# Patient Record
Sex: FEMALE | Race: WHITE | NOT HISPANIC OR LATINO | Employment: STUDENT | URBAN - METROPOLITAN AREA
[De-identification: names, ages, dates, MRNs, and addresses within clinical notes are randomized per-mention and may not be internally consistent; named-entity substitution may affect disease eponyms.]

---

## 2017-05-05 ENCOUNTER — HISTORICAL (OUTPATIENT)
Dept: LAB | Facility: HOSPITAL | Age: 22
End: 2017-05-05

## 2017-05-05 LAB
ABS NEUT (OLG): 4.9 X10(3)/MCL
BASOPHILS # BLD AUTO: 0 X10(3)/MCL
BASOPHILS NFR BLD AUTO: 0.1 % (ref 0–1)
DEPRECATED CALCIDIOL+CALCIFEROL SERPL-MC: 38.7 NG/ML (ref 30–80)
EOSINOPHIL # BLD AUTO: 0 X10(3)/MCL
EOSINOPHIL NFR BLD AUTO: 0.7 % (ref 0–6.4)
ERYTHROCYTE [DISTWIDTH] IN BLOOD BY AUTOMATED COUNT: 13.5 % (ref 11.5–14.8)
HCT VFR BLD AUTO: 47.5 % (ref 35.5–44.6)
HGB BLD-MCNC: 16.3 GM/DL (ref 12.1–15.4)
LYMPHOCYTES # BLD AUTO: 2.1 X10(3)/MCL
LYMPHOCYTES NFR BLD AUTO: 27.7 % (ref 16–44)
MCH RBC QN AUTO: 29.7 PG (ref 28.5–33.8)
MCHC RBC AUTO-ENTMCNC: 34.3 % (ref 33–37)
MCV RBC AUTO: 86.5 FL (ref 82–99)
MONOCYTES # BLD AUTO: 0.5 X10(3)/MCL
MONOCYTES NFR BLD AUTO: 6.6 % (ref 4–12.1)
NEUTROPHILS # BLD AUTO: 4.9 X10(3)/MCL
NEUTROPHILS NFR BLD AUTO: 64.5 % (ref 43–73)
PLATELET # BLD AUTO: 325 X10(3)/MCL (ref 136–369)
PMV BLD AUTO: 9.8 FL (ref 7.4–10.4)
RBC # BLD AUTO: 5.49 X10(6)/MCL (ref 4–5.5)
WBC # SPEC AUTO: 7.6 X10(3)/MCL (ref 4–10)

## 2017-05-15 ENCOUNTER — TELEPHONE (OUTPATIENT)
Dept: BARIATRICS | Facility: CLINIC | Age: 22
End: 2017-05-15

## 2017-05-15 NOTE — TELEPHONE ENCOUNTER
Returned patient's call.  She has regained weight and was calling to see if surgery is an option.  i told her we don't do sx on medicaid pts.  [place]  i told her first to come in and try diet and medications firt.  appts scheduled and sent to her in mail.

## 2017-05-15 NOTE — TELEPHONE ENCOUNTER
----- Message from Sheree Ho sent at 5/15/2017  9:35 AM CDT -----  Contact: self  Pt states she would like to speak with PA in ref to other options regarding her weight loss journey.Please call Ivana swain @ [phone].Thank you.

## 2017-06-13 ENCOUNTER — LAB VISIT (OUTPATIENT)
Dept: LAB | Facility: HOSPITAL | Age: 22
End: 2017-06-13
Attending: SURGERY
Payer: MEDICAID

## 2017-06-13 ENCOUNTER — OFFICE VISIT (OUTPATIENT)
Dept: BARIATRICS | Facility: CLINIC | Age: 22
End: 2017-06-13
Payer: MEDICAID

## 2017-06-13 ENCOUNTER — PATIENT MESSAGE (OUTPATIENT)
Dept: BARIATRICS | Facility: CLINIC | Age: 22
End: 2017-06-13

## 2017-06-13 ENCOUNTER — TELEPHONE (OUTPATIENT)
Dept: BARIATRICS | Facility: CLINIC | Age: 22
End: 2017-06-13

## 2017-06-13 VITALS
SYSTOLIC BLOOD PRESSURE: 90 MMHG | WEIGHT: 211 LBS | BODY MASS INDEX: 38.83 KG/M2 | HEART RATE: 89 BPM | HEIGHT: 62 IN | DIASTOLIC BLOOD PRESSURE: 60 MMHG

## 2017-06-13 DIAGNOSIS — G47.33 OSA (OBSTRUCTIVE SLEEP APNEA): ICD-10-CM

## 2017-06-13 DIAGNOSIS — Z98.84 S/P LAPAROSCOPIC SLEEVE GASTRECTOMY: ICD-10-CM

## 2017-06-13 DIAGNOSIS — E66.9 OBESITY, UNSPECIFIED OBESITY SEVERITY, UNSPECIFIED OBESITY TYPE: ICD-10-CM

## 2017-06-13 DIAGNOSIS — E78.5 HYPERLIPIDEMIA, UNSPECIFIED HYPERLIPIDEMIA TYPE: ICD-10-CM

## 2017-06-13 DIAGNOSIS — Z90.3 HISTORY OF SLEEVE GASTRECTOMY: ICD-10-CM

## 2017-06-13 DIAGNOSIS — E55.9 VITAMIN D DEFICIENCY: ICD-10-CM

## 2017-06-13 DIAGNOSIS — K75.81 NASH (NONALCOHOLIC STEATOHEPATITIS): ICD-10-CM

## 2017-06-13 LAB
25(OH)D3+25(OH)D2 SERPL-MCNC: 29 NG/ML
ALBUMIN SERPL BCP-MCNC: 3.9 G/DL
ALP SERPL-CCNC: 109 U/L
ALT SERPL W/O P-5'-P-CCNC: 13 U/L
ANION GAP SERPL CALC-SCNC: 10 MMOL/L
AST SERPL-CCNC: 15 U/L
BASOPHILS # BLD AUTO: 0.01 K/UL
BASOPHILS NFR BLD: 0.1 %
BILIRUB SERPL-MCNC: 0.4 MG/DL
BUN SERPL-MCNC: 10 MG/DL
CALCIUM SERPL-MCNC: 9.4 MG/DL
CHLORIDE SERPL-SCNC: 107 MMOL/L
CHOLEST/HDLC SERPL: 3.1 {RATIO}
CO2 SERPL-SCNC: 23 MMOL/L
CREAT SERPL-MCNC: 0.7 MG/DL
DIFFERENTIAL METHOD: ABNORMAL
EOSINOPHIL # BLD AUTO: 0 K/UL
EOSINOPHIL NFR BLD: 0.2 %
ERYTHROCYTE [DISTWIDTH] IN BLOOD BY AUTOMATED COUNT: 13.3 %
EST. GFR  (AFRICAN AMERICAN): >60 ML/MIN/1.73 M^2
EST. GFR  (NON AFRICAN AMERICAN): >60 ML/MIN/1.73 M^2
GLUCOSE SERPL-MCNC: 96 MG/DL
HCT VFR BLD AUTO: 45.3 %
HDL/CHOLESTEROL RATIO: 32.7 %
HDLC SERPL-MCNC: 147 MG/DL
HDLC SERPL-MCNC: 48 MG/DL
HGB BLD-MCNC: 15.1 G/DL
IRON SERPL-MCNC: 45 UG/DL
LDLC SERPL CALC-MCNC: 86 MG/DL
LYMPHOCYTES # BLD AUTO: 2.6 K/UL
LYMPHOCYTES NFR BLD: 22.3 %
MCH RBC QN AUTO: 29.4 PG
MCHC RBC AUTO-ENTMCNC: 33.3 %
MCV RBC AUTO: 88 FL
MONOCYTES # BLD AUTO: 0.7 K/UL
MONOCYTES NFR BLD: 6 %
NEUTROPHILS # BLD AUTO: 8.2 K/UL
NEUTROPHILS NFR BLD: 71 %
NONHDLC SERPL-MCNC: 99 MG/DL
PLATELET # BLD AUTO: 352 K/UL
PMV BLD AUTO: 9.5 FL
POTASSIUM SERPL-SCNC: 3.7 MMOL/L
PROT SERPL-MCNC: 7.5 G/DL
RBC # BLD AUTO: 5.14 M/UL
SATURATED IRON: 10 %
SODIUM SERPL-SCNC: 140 MMOL/L
TOTAL IRON BINDING CAPACITY: 453 UG/DL
TRANSFERRIN SERPL-MCNC: 306 MG/DL
TRIGL SERPL-MCNC: 65 MG/DL
VIT B12 SERPL-MCNC: 415 PG/ML
WBC # BLD AUTO: 11.58 K/UL

## 2017-06-13 PROCEDURE — 99214 OFFICE O/P EST MOD 30 MIN: CPT | Mod: S$PBB,,, | Performed by: PHYSICIAN ASSISTANT

## 2017-06-13 PROCEDURE — 85025 COMPLETE CBC W/AUTO DIFF WBC: CPT

## 2017-06-13 PROCEDURE — 99999 PR PBB SHADOW E&M-EST. PATIENT-LVL IV: CPT | Mod: PBBFAC,,, | Performed by: PHYSICIAN ASSISTANT

## 2017-06-13 PROCEDURE — 84425 ASSAY OF VITAMIN B-1: CPT

## 2017-06-13 PROCEDURE — 82306 VITAMIN D 25 HYDROXY: CPT

## 2017-06-13 PROCEDURE — 80061 LIPID PANEL: CPT

## 2017-06-13 PROCEDURE — 82607 VITAMIN B-12: CPT

## 2017-06-13 PROCEDURE — 80053 COMPREHEN METABOLIC PANEL: CPT

## 2017-06-13 PROCEDURE — 83540 ASSAY OF IRON: CPT

## 2017-06-13 RX ORDER — ATORVASTATIN CALCIUM 20 MG/1
1 TABLET, FILM COATED ORAL DAILY
COMMUNITY
Start: 2017-05-23

## 2017-06-13 RX ORDER — PREDNISONE 20 MG/1
1 TABLET ORAL DAILY
COMMUNITY
Start: 2017-06-12

## 2017-06-13 RX ORDER — BENZONATATE 200 MG/1
1 CAPSULE ORAL 3 TIMES DAILY PRN
COMMUNITY
Start: 2017-06-12

## 2017-06-13 RX ORDER — LORATADINE 10 MG/1
1 TABLET ORAL DAILY
COMMUNITY
Start: 2017-05-23

## 2017-06-13 RX ORDER — ALBUTEROL SULFATE 0.83 MG/ML
2.5 SOLUTION RESPIRATORY (INHALATION) DAILY PRN
Refills: 3 | COMMUNITY
Start: 2017-04-19 | End: 2017-06-13 | Stop reason: CLARIF

## 2017-06-13 RX ORDER — QUETIAPINE FUMARATE 50 MG/1
2 TABLET, FILM COATED ORAL NIGHTLY
COMMUNITY
Start: 2017-06-08

## 2017-06-13 RX ORDER — ALBUTEROL SULFATE 90 UG/1
1 AEROSOL, METERED RESPIRATORY (INHALATION) DAILY PRN
Refills: 3 | COMMUNITY
Start: 2017-04-19

## 2017-06-13 RX ORDER — FLUTICASONE PROPIONATE 50 MCG
1 SPRAY, SUSPENSION (ML) NASAL DAILY
Refills: 3 | COMMUNITY
Start: 2017-04-19

## 2017-06-13 RX ORDER — MONTELUKAST SODIUM 10 MG/1
1 TABLET ORAL DAILY
COMMUNITY
Start: 2017-05-23

## 2017-06-13 RX ORDER — FLUTICASONE PROPIONATE 220 UG/1
2 AEROSOL, METERED RESPIRATORY (INHALATION) 2 TIMES DAILY
Refills: 3 | COMMUNITY
Start: 2017-04-19

## 2017-06-13 RX ORDER — ERGOCALCIFEROL 1.25 MG/1
1 CAPSULE ORAL WEEKLY
COMMUNITY
Start: 2017-06-09

## 2017-06-13 RX ORDER — LORAZEPAM 0.5 MG/1
1 TABLET ORAL 2 TIMES DAILY
COMMUNITY
Start: 2017-06-08

## 2017-06-13 NOTE — TELEPHONE ENCOUNTER
----- Message from Rohith Glez sent at 6/13/2017  2:15 PM CDT -----  Pt would like for Justine to give her a call when she get the results from lab work. Please call pt at [phone]

## 2017-06-13 NOTE — PATIENT INSTRUCTIONS
- Stop all junk food and sodas  - Take a Cool Brew and add to a Body Fortress.  - Get in more protein      Breakfast options:     - Egg white omelet: 2 egg whites or ½ cup Egg Beaters. (Optional proteins: cheese, shrimp, black beans, chicken, sliced turkey) (Optional veggies: tomatoes, salsa, spinach, mushrooms, onions, green peppers, or small slice avocado)     - Egg and sausage: 1 egg or ¼ cup Egg Beaters (any variety), with 1 chacho or 2 links of Turkey sausage or Veggie breakfast sausage (Openbay or @Pay)    - Crust-less breakfast quiche: To make a glass pie dish, mix 4oz part skim Ricotta, 1 cup skim milk, and 2 eggs as your base. Add protein: shredded cheese, sliced lean ham or turkey, turkey anderson/sausage. Add veggies: tomato, onion, green onion, mushroom, green pepper, spinach, etc.    - Yogurt parfait: Mix 1 - 6oz container Dannon Light N Fit vanilla yogurt, with ¼ cup crushed unsalted nuts    - Cottage cheese and fruit: ½ cup part-skim cottage cheese or ricotta cheese topped with fresh fruit or sugar free preserves     - Shawna Stephenson's Vanilla Egg custard* (add 2 Tbsp instant coffee granules to make Cappuccino Custard*)    - Hi-Protein café latte (skim milk, decaf coffee, 1 scoop protein powder). Optional to add Sugar free syrup or extract flavoring.    - Breakfast Lox: spread fat free cream cheese on slices of smoked salmon. Serve over scrambled or egg over easy (sauteed with nonstick cookspray) OR on a cucumber slice    - Eggwhich: Scramble or cook 1 large egg over easy using nonstick cookspray. Place between 2 slices of Fijian anderson and low fat cheese.     Lunch: (20-30g protein)    - ½ cup Black bean soup (Homemade or Progresso), with ¼ cup shredded low-fat cheese. Top with chopped tomato or fresh salsa.     - Lean deli turkey breast and low-fat sliced cheese, mustard or light tavera to moisten, rolled up together, or wrapped in a Angel lettuce leaf    - Chicken salad made from dinner  leftovers, moisten with low-fat salad dressing or light tavera. Also try leftover salmon, shrimp, tuna or boiled eggs. Serve ½ cup over dark green salad    - Fat-free canned refried beans, topped with ¼ cup shredded low-fat cheese. Top with chopped tomato or fresh salsa.     - Greek salad: Top mixed greens with 1-2oz grilled chicken, tomatoes, red onions, 2-3 kalamata olives, and sprinkle lightly with feta cheese. Spritz with Balsamic vinegar to taste.     - Crust-less lunch quiche: To make a glass pie dish, mix 4oz part skim Ricotta, 1 cup skim milk, and 2 eggs as your base. Add protein: shredded cheese, sliced lean ham or turkey, shrimp, chicken. Add veggies: tomato, onion, green onion, mushroom, green pepper, spinach, artichoke, broccoli, etc.    - Pizza bake: spread a irineo jaxon mushroom with tomato sauce, low-fat shredded mozzarella and turkey pepperoni or [place] anderson. Add any veggies. Roast for 10-15 minutes, until cheese melted.     - Cucumber crab bites: Spread ¼ cup crab dip (lump crabmeat + light cream cheese and green onions) over sliced cucumber.     - Chicken with light spinach and artichoke dip*: Puree in : 6oz cooked and drained spinach, 2 cloves garlic, 1 can cannelloni beans, ½ cup chopped green onions, 1 can drained artichoke hearts (not marinated in oil), lemon juice and basil. Mix in 2oz chopped up chicken.    Supper: (20-30g protein)    - Serve grilled fish over dark green salad tossed with low-fat dressing, served with grilled asparagus barton     - Rotisserie chicken salad: served with sliced strawberries, walnuts, fat-free feta cheese crumbles and 1 tbsp Duggans Own Light Raspberry [place] Vinaigrette    - Shrimp cocktail: Dip cold boiled shrimp in homemade low-sugar cocktail sauce (1/2 cup Tequila One Carb ketchup, 2 tbsp horseradish, 1/4 tsp hot sauce, 1 tsp Worcestershire sauce, 1 tbsp freshly-squeezed lemon juice). Serve with dark green salad, walnuts, and crumbled blue  cheese drizzled with olive oil and Balsamic vinegar    - Tuna Melt: Spread tuna salad onto 2 thick slices of tomato. Top with low-fat cheese and broil until cheese is melted. May also be made with chicken salad of shrimp salad. Dolliver with different types of cheeses.    - Chicken or beef fajitas (no tortilla, rice, beans, chips). Top meat and veggies w/ fresh salsa, fat free sour cream.     - Homemade low-fat Chili using extra lean ground beef or ground turkey. Top with shredded cheese and salsa as desired. May add dollop fat-free sour cream if desired    - Chicken parmesan: Top chicken breast w/ low sugar marinara sauce, mozzarella cheese and bake until chicken reaches 165*. Serve w/ spaghetti SQUASH or Icelandic cut green beans    - Dinner Omelet with shrimp or chicken and onion, green peppers and chives.    - No noodle lasagna: Use sliced zucchini or eggplant in place of noodles. Layer with part skim ricotta cheese and low sugar meat sauce (use very lean ground beef or ground turkey).    - Mexican chicken bake: Bake chunks of chicken breast or thigh with taco seasoning, Pace brand enchilada sauce, green onions and low-fat cheese. Serve with ¼ cup black beans or fat free refried beans topped with chopped tomatoes or salsa.    - Lisa frozen meatballs, simmered in Classico Marinara sauce. Different flavors of salsa or spaghetti sauce create different dishes! Sprinkle with parmesan cheese. Serve with grilled or steamed veggies, or a dark green salad.    - Simmer boneless skinless chicken thigh chunks in Classico Marinara sauce or roasted salsa until tender with chopped onion, bell pepper, garlic, mushrooms, spinach, etc.     - Hamburger or veggie burger, without the bun, dressed the way you like. Served with grilled or steamed veggies.    - Eggplant parmesan: Bake slices of eggplant at 350 degrees for 15 minutes. Layer tomato sauce, sliced eggplant and low-fat mozzarella cheese in a baking dish and cover with  foil. Bake 30-40 more minutes or until bubbly. Uncover and bake at 400 degrees for about 15 more minutes, or until top is slightly crisp.    - Fish tacos: grilled/baked white fish, wrapped in Angel lettuce leaf, topped with salsa, shredded low-fat cheese, and light coleslaw.    Snacks: (100-200 calories; >5g protein)    - 1 low-fat cheese stick with 8 cherry tomatoes or 1 serving fresh fruit  - 4 thin slices fat-free turkey breast and 1 slice low-fat cheese  - 4 thin slices fat-free honey ham with wedge of melon  - 6-8 edamame pods (equivalent to about 1/4 cup edamame without pods).   - 1/4 cup unsalted nuts with ½ cup fruit  - 6-oz container Dannon Light n Fit vanilla yogurt, topped with 1oz unsalted nuts   - apple, celery or baby carrots spread with 2 Tbsp PB2  - apple slices with 1 oz slice low-fat cheese  - Apple slices dipped in 2 Tbsp of PB2  - celery, cucumber, bell pepper or baby carrots dipped in ¼ cup hummus bean spread or light spinach and artichoke dip (*recipe in lunch section)  - celery, cucumber, baby carrots dipped in high protein greek yogurt (Mix 16 oz plain greek yogurt + 1 packet of hidden valley ranch dip mix)  - Bandar Links Beef Steak - 14g protein! (similar to beef jerky)  - 2 wedges Laughing Cow - Light Herb & Garlic Cheese with sliced cucumber or green bell pepper  - 1/2 cup low-fat cottage cheese with ¼ cup fruit or ¼ cup salsa  - RTD Protein drinks: Atkins, Low Carb Slim Fast, EAS light, Muscle Milk Light, etc.  - Homemade Protein drinks: GNC Soy95, Isopure, Nectar, UNJURY, Whey Gourmet, etc. Mix 1 scoop powder with 8oz skim/1% milk or light soymilk.  - Protein bars: Atkins, EAS, Pure Protein, Think Thin, Detour, etc. Must have 0-4 grams sugar - Read the label.    Takeout Options: No more than twice/week  Deli - Salads (no pasta or rice), meats, cheeses. Roasted chicken. Lox (salmon)    Mexican - Platters which don't include tortillas, chips, or rice. Go easy on the beans. Example:  Fajitas without the tortillas. Ask the  not to bring chips to the table if they are too tempting.    Greek - Meat or fish and vegetable, but no bread or rice. Including hummus, baba ganoush, etc, is OK. Most sit-down Greek restaurants can provide you with cucumber slices for dipping instead of wale bread.    Fast Food (Avoid as much as possible) - Salads (no croutons and limit salad dressing to 2 tbsp), grilled chicken sandwich without the bun and ask for no tavera. Agustinas low fat chili or Taco Bell pintos and cheese.    BBQ - The meats are fine if you ask for sauces on the side, but most of the traditional side dishes are loaded with carbs. Kvng slaw, baked beans and BBQ sauce are typically made with sugar.    Chinese - Nothing deep-fried, no rice or noodles. Many Chinese sauces have starch and sugar in them, so you'll have to use your judgement. If you find that these sauces trigger cravings, or cause Dumping, you can ask for the sauce to be made without sugar or just use soy sauce.

## 2017-06-13 NOTE — TELEPHONE ENCOUNTER
Sent patient MY justinstaniya message that when Maria Guadalupe reviews labs she'll be getting back with her.

## 2017-06-13 NOTE — PROGRESS NOTES
BARIATRIC FOLLOW UP:    Chief Complaint   Patient presents with    Follow-up     sleeve       HISTORY OF PRESENT ILLNESS: Ivana Ibarra is a 21 y.o. female with a Body mass index is 38.59 kg/m². who presents for a follow up s/p 12/8/2014 with Dr. Fernandez on 12/8/2014.  She has not been in for follow up for over a year.  She got pregnant not long after her surgery and was never able to lose more weight.  She is having a lot of health issues, most recent with asthma and pneuomonia.  She is smoking.  She wanted some medications to help with her weight loss, but I do not think this is appropriate at this point.  She needs to do a lot of work on her diet before I will send her to Dr. Myles.  She has lost 78 lbs, approximately 50% of their excess weight. She has no other complaints.    Denies: nausea, vomiting, abdominal pain, changes in bowel movement pattern, fever, chills, dysphagia, chest pain, and shortness of breath.    Review of Systems   Constitutional: Positive for chills and fever. Negative for malaise/fatigue.   HENT: Positive for congestion.    Eyes: Negative for blurred vision and double vision.   Respiratory: Positive for cough. Negative for hemoptysis and shortness of breath.    Cardiovascular: Negative for chest pain, palpitations and leg swelling.   Gastrointestinal: Negative for abdominal pain, blood in stool, constipation, diarrhea, heartburn, melena, nausea and vomiting.   Genitourinary: Negative for dysuria and hematuria.   Musculoskeletal: Negative for back pain, falls, joint pain, myalgias and neck pain.   Skin: Negative for rash.   Neurological: Negative for dizziness, tingling, weakness and headaches.   Endo/Heme/Allergies: Negative for environmental allergies. Does not bruise/bleed easily.   Psychiatric/Behavioral: Negative.        EXERCISE & VITAMINS:  See Bariatric Assessment    MEDICATIONS/ALLERGIES:  Have been reviewed.    DIET:  Off track    Vitals:    06/13/17 1121   BP:  90/60   Pulse: 89       Physical Exam   Constitutional: She is oriented to person, place, and time. She appears well-developed and well-nourished.   afebrile   HENT:   Head: Normocephalic and atraumatic.   Cardiovascular: Normal rate and regular rhythm.    Pulmonary/Chest: Effort normal and breath sounds normal.   Abdominal: Soft. Bowel sounds are normal. She exhibits no distension and no mass. There is no tenderness. There is no rebound and no guarding.   WHSS   Musculoskeletal: She exhibits no edema.   Neurological: She is alert and oriented to person, place, and time.   Skin: Skin is warm and dry. No rash noted. No erythema. No pallor.   Psychiatric: She has a normal mood and affect. Her behavior is normal.   Nursing note and vitals reviewed.      ASSESSMENT:  - Obesity, Body mass index is 38.59 kg/m².,  s/p sleeve gastrectomy on 12/8/2014.  - Estimated goal weight, 210 lbs, which is 50% EWL  - Co-morbidities: TOPHER (stable), BPAD (stable), HLD (stable), WILLIAM (stable)  - Good Weight loss, 78 lbs, 50% EWL  - No Exercise regimen  - No Vitamin Regimen  - Poor Diet  - Not at risk for fall or abuse    PLAN:  - Emphasized the importance of regular exercise and adherence to bariatric diet to achieve maximum weight loss.  - Encouraged patient to restart regular exercise.  - Follow-up with dietician to reinforce diet.  - Continue daily vitamins and medications.  - Anti-Acid medication, Omeprazole daily.  - Miralax daily for constipation, no fiber.  - RTC in 1 month or sooner if needed.  - Call the office for any issues.  - Check labs today.    25 minute visit, over 50% of time spent counseling patient face to face on diet, exercise, and weight loss.

## 2017-06-16 LAB — VIT B1 SERPL-MCNC: 46 UG/L (ref 38–122)

## 2017-06-20 ENCOUNTER — HISTORICAL (OUTPATIENT)
Dept: LAB | Facility: HOSPITAL | Age: 22
End: 2017-06-20

## 2017-07-11 ENCOUNTER — HISTORICAL (OUTPATIENT)
Dept: RADIOLOGY | Facility: HOSPITAL | Age: 22
End: 2017-07-11

## 2017-07-26 ENCOUNTER — HISTORICAL (OUTPATIENT)
Dept: ADMINISTRATIVE | Facility: HOSPITAL | Age: 22
End: 2017-07-26

## 2017-09-20 ENCOUNTER — HISTORICAL (OUTPATIENT)
Dept: RADIOLOGY | Facility: HOSPITAL | Age: 22
End: 2017-09-20

## 2017-10-18 ENCOUNTER — HOSPITAL ENCOUNTER (OUTPATIENT)
Dept: ONCOLOGY | Facility: HOSPITAL | Age: 22
End: 2017-10-18
Attending: INTERNAL MEDICINE | Admitting: INTERNAL MEDICINE

## 2017-10-18 LAB
ABS NEUT (OLG): 10.1 X10(3)/MCL (ref 2.1–9.2)
ALBUMIN SERPL-MCNC: 3.6 GM/DL (ref 3.4–5)
ALBUMIN/GLOB SERPL: 1.1 {RATIO}
ALP SERPL-CCNC: 98 UNIT/L (ref 38–126)
ALT SERPL-CCNC: 17 UNIT/L (ref 12–78)
APPEARANCE, UA: CLEAR
APTT PPP: 29.5 SECOND(S) (ref 24.8–36.9)
AST SERPL-CCNC: 11 UNIT/L (ref 15–37)
BACTERIA SPEC CULT: ABNORMAL /HPF
BASOPHILS # BLD AUTO: 0 X10(3)/MCL (ref 0–0.2)
BASOPHILS NFR BLD AUTO: 0 %
BILIRUB SERPL-MCNC: 0.2 MG/DL (ref 0.2–1)
BILIRUB UR QL STRIP: NEGATIVE
BILIRUBIN DIRECT+TOT PNL SERPL-MCNC: 0.1 MG/DL (ref 0–0.2)
BILIRUBIN DIRECT+TOT PNL SERPL-MCNC: 0.1 MG/DL (ref 0–0.8)
BUN SERPL-MCNC: 16 MG/DL (ref 7–18)
CALCIUM SERPL-MCNC: 8.5 MG/DL (ref 8.5–10.1)
CHLORIDE SERPL-SCNC: 110 MMOL/L (ref 98–107)
CO2 SERPL-SCNC: 23 MMOL/L (ref 21–32)
COLOR UR: YELLOW
CREAT SERPL-MCNC: 0.63 MG/DL (ref 0.55–1.02)
EOSINOPHIL # BLD AUTO: 0.1 X10(3)/MCL (ref 0–0.9)
EOSINOPHIL NFR BLD AUTO: 0 %
ERYTHROCYTE [DISTWIDTH] IN BLOOD BY AUTOMATED COUNT: 12.7 % (ref 11.5–17)
GLOBULIN SER-MCNC: 3.3 GM/DL (ref 2.4–3.5)
GLUCOSE (UA): NEGATIVE
GLUCOSE SERPL-MCNC: 113 MG/DL (ref 74–106)
GROUP & RH: NORMAL
HCT VFR BLD AUTO: 46 % (ref 37–47)
HGB BLD-MCNC: 15.6 GM/DL (ref 12–16)
HGB UR QL STRIP: NEGATIVE
INR PPP: 1 (ref 0–1.27)
KETONES UR QL STRIP: NEGATIVE
LEUKOCYTE ESTERASE UR QL STRIP: NEGATIVE
LIPASE SERPL-CCNC: 124 UNIT/L (ref 73–393)
LYMPHOCYTES # BLD AUTO: 2.5 X10(3)/MCL (ref 0.6–4.6)
LYMPHOCYTES NFR BLD AUTO: 19 %
MCH RBC QN AUTO: 29.8 PG (ref 27–31)
MCHC RBC AUTO-ENTMCNC: 33.9 GM/DL (ref 33–36)
MCV RBC AUTO: 87.8 FL (ref 80–94)
MONOCYTES # BLD AUTO: 0.7 X10(3)/MCL (ref 0.1–1.3)
MONOCYTES NFR BLD AUTO: 5 %
NEUTROPHILS # BLD AUTO: 10.1 X10(3)/MCL (ref 1.4–7.9)
NEUTROPHILS NFR BLD AUTO: 75 %
NITRITE UR QL STRIP: NEGATIVE
PH UR STRIP: 6.5 [PH] (ref 5–9)
PLATELET # BLD AUTO: 283 X10(3)/MCL (ref 130–400)
PMV BLD AUTO: 9.2 FL (ref 9.4–12.4)
POTASSIUM SERPL-SCNC: 3.5 MMOL/L (ref 3.5–5.1)
PROT SERPL-MCNC: 6.9 GM/DL (ref 6.4–8.2)
PROT UR QL STRIP: NEGATIVE
PROTHROMBIN TIME: 13 SECOND(S) (ref 12.2–14.7)
RBC # BLD AUTO: 5.24 X10(6)/MCL (ref 4.2–5.4)
RBC #/AREA URNS HPF: 6 /HPF (ref 0–2)
SODIUM SERPL-SCNC: 141 MMOL/L (ref 136–145)
SP GR UR STRIP: 1.02 (ref 1–1.03)
SQUAMOUS EPITHELIAL, UA: ABNORMAL
UROBILINOGEN UR STRIP-ACNC: 1
WBC # SPEC AUTO: 13.5 X10(3)/MCL (ref 4.5–11.5)
WBC #/AREA URNS HPF: ABNORMAL /[HPF]

## 2017-11-15 ENCOUNTER — HISTORICAL (OUTPATIENT)
Dept: INTERNAL MEDICINE | Facility: CLINIC | Age: 22
End: 2017-11-15

## 2018-01-18 ENCOUNTER — HISTORICAL (OUTPATIENT)
Dept: ADMINISTRATIVE | Facility: HOSPITAL | Age: 23
End: 2018-01-18

## 2018-01-18 LAB
ABS NEUT (OLG): 8.35 X10(3)/MCL (ref 2.1–9.2)
APPEARANCE, UA: ABNORMAL
BACTERIA #/AREA URNS AUTO: ABNORMAL /[HPF]
BASOPHILS # BLD AUTO: 0.03 X10(3)/MCL
BASOPHILS NFR BLD AUTO: 0 % (ref 0–1)
BILIRUB SERPL-MCNC: NEGATIVE MG/DL
BILIRUB UR QL STRIP: NEGATIVE
BLOOD URINE, POC: NEGATIVE
BUN SERPL-MCNC: 6 MG/DL (ref 7–18)
CALCIUM SERPL-MCNC: 8.8 MG/DL (ref 8.5–10.1)
CHLORIDE SERPL-SCNC: 105 MMOL/L (ref 98–107)
CLARITY, POC UA: NORMAL
CO2 SERPL-SCNC: 25 MMOL/L (ref 21–32)
COLOR UR: YELLOW
COLOR, POC UA: YELLOW
CREAT SERPL-MCNC: 0.4 MG/DL (ref 0.6–1.3)
EOSINOPHIL # BLD AUTO: 0.01 X10(3)/MCL
EOSINOPHIL NFR BLD AUTO: 0 % (ref 0–5)
ERYTHROCYTE [DISTWIDTH] IN BLOOD BY AUTOMATED COUNT: 13.4 % (ref 11.5–14.5)
GLUCOSE (UA): NORMAL
GLUCOSE SERPL-MCNC: 75 MG/DL (ref 74–106)
GLUCOSE UR QL STRIP: NEGATIVE
HBV SURFACE AG SERPL QL IA: NEGATIVE
HCT VFR BLD AUTO: 43.1 % (ref 35–46)
HCV AB SERPL QL IA: NONREACTIVE
HGB BLD-MCNC: 14.8 GM/DL (ref 12–16)
HGB UR QL STRIP: NEGATIVE
HIV 1+2 AB+HIV1 P24 AG SERPL QL IA: NONREACTIVE
HYALINE CASTS #/AREA URNS LPF: ABNORMAL /[LPF]
IMM GRANULOCYTES # BLD AUTO: 0.08 10*3/UL
IMM GRANULOCYTES NFR BLD AUTO: 1 %
KETONES UR QL STRIP: NEGATIVE
KETONES UR QL STRIP: NEGATIVE
LEUKOCYTE EST, POC UA: NEGATIVE
LEUKOCYTE ESTERASE UR QL STRIP: NEGATIVE
LYMPHOCYTES # BLD AUTO: 1.98 X10(3)/MCL
LYMPHOCYTES NFR BLD AUTO: 18 % (ref 15–40)
MCH RBC QN AUTO: 29.4 PG (ref 26–34)
MCHC RBC AUTO-ENTMCNC: 34.3 GM/DL (ref 31–37)
MCV RBC AUTO: 85.7 FL (ref 80–100)
MONOCYTES # BLD AUTO: 0.55 X10(3)/MCL
MONOCYTES NFR BLD AUTO: 5 % (ref 4–12)
NEUTROPHILS # BLD AUTO: 8.35 X10(3)/MCL
NEUTROPHILS NFR BLD AUTO: 76 X10(3)/MCL
NITRITE UR QL STRIP: NEGATIVE
NITRITE, POC UA: NEGATIVE
PH UR STRIP: 7.5 [PH] (ref 4.5–8)
PH, POC UA: 7
PLATELET # BLD AUTO: 330 X10(3)/MCL (ref 130–400)
PMV BLD AUTO: 10 FL (ref 7.4–10.4)
POTASSIUM SERPL-SCNC: 3.6 MMOL/L (ref 3.5–5.1)
PROT UR QL STRIP: 10 MG/DL
PROTEIN, POC: NEGATIVE
RBC # BLD AUTO: 5.03 X10(6)/MCL (ref 4–5.2)
RBC #/AREA URNS AUTO: ABNORMAL /[HPF]
SODIUM SERPL-SCNC: 139 MMOL/L (ref 136–145)
SP GR UR STRIP: 1.01 (ref 1–1.03)
SPECIFIC GRAVITY, POC UA: 1.01
SQUAMOUS #/AREA URNS LPF: >100 /[LPF]
T PALLIDUM AB SER QL: NONREACTIVE
UROBILINOGEN UR STRIP-ACNC: NORMAL
UROBILINOGEN, POC UA: NORMAL
WBC # SPEC AUTO: 11 X10(3)/MCL (ref 4.5–11)
WBC #/AREA URNS AUTO: ABNORMAL /HPF

## 2018-01-20 LAB — FINAL CULTURE: NORMAL

## 2018-02-12 ENCOUNTER — HISTORICAL (OUTPATIENT)
Dept: ADMINISTRATIVE | Facility: HOSPITAL | Age: 23
End: 2018-02-12

## 2018-02-12 LAB
BILIRUB SERPL-MCNC: NEGATIVE MG/DL
BLOOD URINE, POC: NEGATIVE
CLARITY, POC UA: NORMAL
COLOR, POC UA: YELLOW
GLUCOSE UR QL STRIP: NEGATIVE
KETONES UR QL STRIP: NEGATIVE
LEUKOCYTE EST, POC UA: NEGATIVE
NITRITE, POC UA: NEGATIVE
PH, POC UA: 7
PROTEIN, POC: NORMAL
SPECIFIC GRAVITY, POC UA: 1.01
T4 FREE SERPL-MCNC: 0.97 NG/DL (ref 0.76–1.46)
TSH SERPL-ACNC: 2.78 MIU/L (ref 0.36–3.74)
UROBILINOGEN, POC UA: NORMAL

## 2018-02-13 LAB — PROT 24H UR-MCNC: 168.8 MG/24HR

## 2018-03-13 LAB
BILIRUB SERPL-MCNC: NEGATIVE MG/DL
BLOOD URINE, POC: NORMAL
CLARITY, POC UA: NORMAL
COLOR, POC UA: NORMAL
GLUCOSE UR QL STRIP: NEGATIVE
KETONES UR QL STRIP: NEGATIVE
LEUKOCYTE EST, POC UA: NEGATIVE
NITRITE, POC UA: NEGATIVE
PH, POC UA: 7
PROTEIN, POC: NORMAL
SPECIFIC GRAVITY, POC UA: 1.01
UROBILINOGEN, POC UA: NORMAL

## 2018-03-26 ENCOUNTER — HOSPITAL ENCOUNTER (OUTPATIENT)
Dept: OBSTETRICS AND GYNECOLOGY | Facility: HOSPITAL | Age: 23
End: 2018-03-26
Attending: OBSTETRICS & GYNECOLOGY | Admitting: OBSTETRICS & GYNECOLOGY

## 2018-03-26 LAB
APPEARANCE, UA: CLEAR
BACTERIA SPEC CULT: ABNORMAL /HPF
BILIRUB UR QL STRIP: NEGATIVE
COLOR UR: YELLOW
GLUCOSE (UA): NEGATIVE
HGB UR QL STRIP: NEGATIVE
KETONES UR QL STRIP: NEGATIVE
LEUKOCYTE ESTERASE UR QL STRIP: NEGATIVE
NITRITE UR QL STRIP: NEGATIVE
PH UR STRIP: 6 [PH] (ref 5–9)
PROT UR QL STRIP: NEGATIVE
RBC #/AREA URNS HPF: ABNORMAL /HPF
SP GR UR STRIP: 1.02 (ref 1–1.03)
SQUAMOUS EPITHELIAL, UA: ABNORMAL
UROBILINOGEN UR STRIP-ACNC: 1
WBC #/AREA URNS HPF: ABNORMAL /HPF

## 2018-03-28 LAB — FINAL CULTURE: NORMAL

## 2018-04-09 LAB
BILIRUB SERPL-MCNC: NEGATIVE MG/DL
BLOOD URINE, POC: NEGATIVE
CLARITY, POC UA: NORMAL
COLOR, POC UA: NORMAL
GLUCOSE UR QL STRIP: NEGATIVE
KETONES UR QL STRIP: NEGATIVE
LEUKOCYTE EST, POC UA: NEGATIVE
NITRITE, POC UA: NEGATIVE
PH, POC UA: 7.5
PROTEIN, POC: NORMAL
SPECIFIC GRAVITY, POC UA: 1.01
UROBILINOGEN, POC UA: NORMAL

## 2018-04-25 ENCOUNTER — HOSPITAL ENCOUNTER (OUTPATIENT)
Dept: OBSTETRICS AND GYNECOLOGY | Facility: HOSPITAL | Age: 23
End: 2018-04-25
Attending: OBSTETRICS & GYNECOLOGY | Admitting: OBSTETRICS & GYNECOLOGY

## 2018-05-07 ENCOUNTER — HISTORICAL (OUTPATIENT)
Dept: ADMINISTRATIVE | Facility: HOSPITAL | Age: 23
End: 2018-05-07

## 2018-05-07 LAB
ABS NEUT (OLG): 8.44 X10(3)/MCL (ref 2.1–9.2)
ALT SERPL-CCNC: 14 UNIT/L (ref 12–78)
AST SERPL-CCNC: 14 UNIT/L (ref 15–37)
BASOPHILS # BLD AUTO: 0.02 X10(3)/MCL
BASOPHILS NFR BLD AUTO: 0 %
BILIRUB SERPL-MCNC: NEGATIVE MG/DL
BLOOD URINE, POC: NEGATIVE
CLARITY, POC UA: NORMAL
COLOR, POC UA: YELLOW
CREAT SERPL-MCNC: 0.5 MG/DL (ref 0.6–1.3)
EOSINOPHIL # BLD AUTO: 0.03 X10(3)/MCL
EOSINOPHIL NFR BLD AUTO: 0 %
ERYTHROCYTE [DISTWIDTH] IN BLOOD BY AUTOMATED COUNT: 12.7 % (ref 11.5–14.5)
EST. AVERAGE GLUCOSE BLD GHB EST-MCNC: 94 MG/DL
GLUCOSE UR QL STRIP: NEGATIVE
HBA1C MFR BLD: 4.9 % (ref 4.2–6.3)
HCT VFR BLD AUTO: 37.8 % (ref 35–46)
HGB BLD-MCNC: 12.4 GM/DL (ref 12–16)
IMM GRANULOCYTES # BLD AUTO: 0.08 10*3/UL
IMM GRANULOCYTES NFR BLD AUTO: 1 %
KETONES UR QL STRIP: NEGATIVE
LEUKOCYTE EST, POC UA: NEGATIVE
LYMPHOCYTES # BLD AUTO: 2.06 X10(3)/MCL
LYMPHOCYTES NFR BLD AUTO: 18 % (ref 13–40)
MCH RBC QN AUTO: 28.2 PG (ref 26–34)
MCHC RBC AUTO-ENTMCNC: 32.8 GM/DL (ref 31–37)
MCV RBC AUTO: 85.9 FL (ref 80–100)
MONOCYTES # BLD AUTO: 0.57 X10(3)/MCL
MONOCYTES NFR BLD AUTO: 5 % (ref 4–12)
NEUTROPHILS # BLD AUTO: 8.44 X10(3)/MCL
NEUTROPHILS NFR BLD AUTO: 75 X10(3)/MCL
NITRITE, POC UA: NEGATIVE
PH, POC UA: 7.5
PLATELET # BLD AUTO: 333 X10(3)/MCL (ref 130–400)
PMV BLD AUTO: 10.3 FL (ref 7.4–10.4)
PROTEIN, POC: NORMAL
RBC # BLD AUTO: 4.4 X10(6)/MCL (ref 4–5.2)
SPECIFIC GRAVITY, POC UA: 1.01
UROBILINOGEN, POC UA: NORMAL
VIT B12 SERPL-MCNC: 172 PG/ML (ref 193–986)
WBC # SPEC AUTO: 11.2 X10(3)/MCL (ref 4.5–11)

## 2018-05-10 ENCOUNTER — HISTORICAL (OUTPATIENT)
Dept: FAMILY MEDICINE | Facility: CLINIC | Age: 23
End: 2018-05-10

## 2018-05-10 LAB — PROT 24H UR-MCNC: 347.7 MG/24HR

## 2018-05-16 ENCOUNTER — PATIENT MESSAGE (OUTPATIENT)
Dept: SURGERY | Facility: CLINIC | Age: 23
End: 2018-05-16

## 2018-05-21 LAB
BILIRUB SERPL-MCNC: NEGATIVE MG/DL
BLOOD URINE, POC: NEGATIVE
CLARITY, POC UA: NORMAL
COLOR, POC UA: YELLOW
GLUCOSE UR QL STRIP: NEGATIVE
KETONES UR QL STRIP: NEGATIVE
LEUKOCYTE EST, POC UA: NORMAL
NITRITE, POC UA: NEGATIVE
PH, POC UA: 7
PROTEIN, POC: NORMAL
SPECIFIC GRAVITY, POC UA: 1
UROBILINOGEN, POC UA: NORMAL

## 2018-05-26 ENCOUNTER — HOSPITAL ENCOUNTER (OUTPATIENT)
Dept: OBSTETRICS AND GYNECOLOGY | Facility: HOSPITAL | Age: 23
End: 2018-05-26
Attending: OBSTETRICS & GYNECOLOGY | Admitting: OBSTETRICS & GYNECOLOGY

## 2018-06-01 ENCOUNTER — HOSPITAL ENCOUNTER (OUTPATIENT)
Dept: OBSTETRICS AND GYNECOLOGY | Facility: HOSPITAL | Age: 23
End: 2018-06-02
Attending: OBSTETRICS & GYNECOLOGY | Admitting: OBSTETRICS & GYNECOLOGY

## 2018-06-01 LAB
ABS NEUT (OLG): 8.1 X10(3)/MCL (ref 2.1–9.2)
ALBUMIN SERPL-MCNC: 2.7 GM/DL (ref 3.4–5)
ALBUMIN/GLOB SERPL: 0.7 RATIO (ref 1.1–2)
ALP SERPL-CCNC: 121 UNIT/L (ref 38–126)
ALT SERPL-CCNC: 15 UNIT/L (ref 12–78)
ANISOCYTOSIS BLD QL SMEAR: 1
AST SERPL-CCNC: 25 UNIT/L (ref 15–37)
BILIRUB SERPL-MCNC: 0.3 MG/DL (ref 0.2–1)
BILIRUBIN DIRECT+TOT PNL SERPL-MCNC: 0 MG/DL (ref 0–0.5)
BILIRUBIN DIRECT+TOT PNL SERPL-MCNC: 0.3 MG/DL (ref 0–0.8)
BUN SERPL-MCNC: 6 MG/DL (ref 7–18)
CALCIUM SERPL-MCNC: 8.5 MG/DL (ref 8.5–10.1)
CHLORIDE SERPL-SCNC: 105 MMOL/L (ref 98–107)
CO2 SERPL-SCNC: 21 MMOL/L (ref 21–32)
CREAT SERPL-MCNC: 0.44 MG/DL (ref 0.55–1.02)
EOSINOPHIL NFR BLD MANUAL: 1 % (ref 0–8)
ERYTHROCYTE [DISTWIDTH] IN BLOOD BY AUTOMATED COUNT: 13.1 % (ref 11.5–17)
GLOBULIN SER-MCNC: 3.7 GM/DL (ref 2.4–3.5)
GLUCOSE SERPL-MCNC: 72 MG/DL (ref 74–106)
HCT VFR BLD AUTO: 36.2 % (ref 37–47)
HGB BLD-MCNC: 11.8 GM/DL (ref 12–16)
LYMPHOCYTES NFR BLD MANUAL: 25 % (ref 13–40)
MCH RBC QN AUTO: 28.1 PG (ref 27–31)
MCHC RBC AUTO-ENTMCNC: 32.6 GM/DL (ref 33–36)
MCV RBC AUTO: 86.2 FL (ref 80–94)
MICROCYTES BLD QL SMEAR: 1
MONOCYTES NFR BLD MANUAL: 4 % (ref 2–11)
NEUTROPHILS NFR BLD MANUAL: 70 % (ref 47–80)
PLATELET # BLD AUTO: 339 X10(3)/MCL (ref 130–400)
PLATELET # BLD EST: NORMAL 10*3/UL
PMV BLD AUTO: 10.3 FL (ref 7.4–10.4)
POTASSIUM SERPL-SCNC: 4.2 MMOL/L (ref 3.5–5.1)
PROT SERPL-MCNC: 6.4 GM/DL (ref 6.4–8.2)
RBC # BLD AUTO: 4.2 X10(6)/MCL (ref 4.2–5.4)
SODIUM SERPL-SCNC: 136 MMOL/L (ref 136–145)
WBC # SPEC AUTO: 11.8 X10(3)/MCL (ref 4.5–11.5)

## 2018-06-11 LAB
BILIRUB SERPL-MCNC: NEGATIVE MG/DL
BLOOD URINE, POC: NEGATIVE
CLARITY, POC UA: NORMAL
COLOR, POC UA: YELLOW
GLUCOSE UR QL STRIP: NEGATIVE
KETONES UR QL STRIP: NEGATIVE
LEUKOCYTE EST, POC UA: NORMAL
NITRITE, POC UA: NEGATIVE
PH, POC UA: 7.5
PROTEIN, POC: NORMAL
SPECIFIC GRAVITY, POC UA: 1
UROBILINOGEN, POC UA: NORMAL

## 2018-06-25 LAB
BILIRUB SERPL-MCNC: NEGATIVE MG/DL
BLOOD URINE, POC: NEGATIVE
CLARITY, POC UA: NORMAL
COLOR, POC UA: YELLOW
GLUCOSE UR QL STRIP: NORMAL
KETONES UR QL STRIP: NEGATIVE
LEUKOCYTE EST, POC UA: NEGATIVE
NITRITE, POC UA: NEGATIVE
PH, POC UA: 7.5
PROTEIN, POC: NORMAL
SPECIFIC GRAVITY, POC UA: 1
UROBILINOGEN, POC UA: NORMAL

## 2018-07-03 ENCOUNTER — HOSPITAL ENCOUNTER (OUTPATIENT)
Dept: OBSTETRICS AND GYNECOLOGY | Facility: HOSPITAL | Age: 23
End: 2018-07-03
Attending: OBSTETRICS & GYNECOLOGY | Admitting: OBSTETRICS & GYNECOLOGY

## 2018-07-05 ENCOUNTER — HOSPITAL ENCOUNTER (OUTPATIENT)
Dept: OBSTETRICS AND GYNECOLOGY | Facility: HOSPITAL | Age: 23
End: 2018-07-05
Attending: OBSTETRICS & GYNECOLOGY | Admitting: OBSTETRICS & GYNECOLOGY

## 2018-07-09 ENCOUNTER — HISTORICAL (OUTPATIENT)
Dept: ADMINISTRATIVE | Facility: HOSPITAL | Age: 23
End: 2018-07-09

## 2018-07-09 LAB
ABS NEUT (OLG): 8.53 X10(3)/MCL (ref 2.1–9.2)
BASOPHILS # BLD AUTO: 0.02 X10(3)/MCL
BASOPHILS NFR BLD AUTO: 0 %
BILIRUB SERPL-MCNC: NEGATIVE MG/DL
BLOOD URINE, POC: NEGATIVE
CLARITY, POC UA: CLEAR
COLOR, POC UA: NORMAL
EOSINOPHIL # BLD AUTO: 0.02 10*3/UL
EOSINOPHIL NFR BLD AUTO: 0 %
ERYTHROCYTE [DISTWIDTH] IN BLOOD BY AUTOMATED COUNT: 14.2 % (ref 11.5–14.5)
GLUCOSE UR QL STRIP: NEGATIVE
HCT VFR BLD AUTO: 37 % (ref 35–46)
HGB BLD-MCNC: 11.9 GM/DL (ref 12–16)
HIV 1+2 AB+HIV1 P24 AG SERPL QL IA: NONREACTIVE
IMM GRANULOCYTES # BLD AUTO: 0.04 10*3/UL
IMM GRANULOCYTES NFR BLD AUTO: 0 %
KETONES UR QL STRIP: NEGATIVE
LEUKOCYTE EST, POC UA: NORMAL
LYMPHOCYTES # BLD AUTO: 1.98 X10(3)/MCL
LYMPHOCYTES NFR BLD AUTO: 18 % (ref 13–40)
MCH RBC QN AUTO: 25.1 PG (ref 26–34)
MCHC RBC AUTO-ENTMCNC: 32.2 GM/DL (ref 31–37)
MCV RBC AUTO: 78.1 FL (ref 80–100)
MONOCYTES # BLD AUTO: 0.67 X10(3)/MCL
MONOCYTES NFR BLD AUTO: 6 % (ref 4–12)
NEUTROPHILS # BLD AUTO: 8.53 X10(3)/MCL
NEUTROPHILS NFR BLD AUTO: 76 X10(3)/MCL
NITRITE, POC UA: NEGATIVE
PH, POC UA: 7.5
PLATELET # BLD AUTO: 412 X10(3)/MCL (ref 130–400)
PMV BLD AUTO: 10.1 FL (ref 7.4–10.4)
PROTEIN, POC: NEGATIVE
RBC # BLD AUTO: 4.74 X10(6)/MCL (ref 4–5.2)
SPECIFIC GRAVITY, POC UA: 1.01
T PALLIDUM AB SER QL: NONREACTIVE
UROBILINOGEN, POC UA: NORMAL
WBC # SPEC AUTO: 11.3 X10(3)/MCL (ref 4.5–11)

## 2018-07-11 LAB — FINAL CULTURE: NORMAL

## 2018-08-15 LAB — POC BETA-HCG (QUAL): NEGATIVE

## 2018-09-07 ENCOUNTER — HISTORICAL (OUTPATIENT)
Dept: ADMINISTRATIVE | Facility: HOSPITAL | Age: 23
End: 2018-09-07

## 2018-09-07 LAB
ALBUMIN SERPL-MCNC: 3.8 GM/DL (ref 3.4–5)
ALBUMIN/GLOB SERPL: 1 RATIO (ref 1–2)
ALP SERPL-CCNC: 97 UNIT/L (ref 45–117)
ALT SERPL-CCNC: 21 UNIT/L (ref 12–78)
AST SERPL-CCNC: 18 UNIT/L (ref 15–37)
BILIRUB SERPL-MCNC: 0.5 MG/DL (ref 0.2–1)
BILIRUBIN DIRECT+TOT PNL SERPL-MCNC: <0.1 MG/DL
BILIRUBIN DIRECT+TOT PNL SERPL-MCNC: ABNORMAL MG/DL
BUN SERPL-MCNC: 11 MG/DL (ref 7–18)
CALCIUM SERPL-MCNC: 8.5 MG/DL (ref 8.5–10.1)
CHLORIDE SERPL-SCNC: 111 MMOL/L (ref 98–107)
CO2 SERPL-SCNC: 24 MMOL/L (ref 21–32)
CREAT SERPL-MCNC: 0.6 MG/DL (ref 0.6–1.3)
ERYTHROCYTE [DISTWIDTH] IN BLOOD BY AUTOMATED COUNT: 20.2 % (ref 11.5–14.5)
GLOBULIN SER-MCNC: 3.9 GM/ML (ref 2.3–3.5)
GLUCOSE SERPL-MCNC: 82 MG/DL (ref 74–106)
HCT VFR BLD AUTO: 43 % (ref 35–46)
HGB BLD-MCNC: 13.8 GM/DL (ref 12–16)
MCH RBC QN AUTO: 24.8 PG (ref 26–34)
MCHC RBC AUTO-ENTMCNC: 32.1 GM/DL (ref 31–37)
MCV RBC AUTO: 77.2 FL (ref 80–100)
PLATELET # BLD AUTO: 414 X10(3)/MCL (ref 130–400)
PMV BLD AUTO: 9.6 FL (ref 7.4–10.4)
POTASSIUM SERPL-SCNC: 3.8 MMOL/L (ref 3.5–5.1)
PROT SERPL-MCNC: 7.7 GM/DL (ref 6.4–8.2)
RBC # BLD AUTO: 5.57 X10(6)/MCL (ref 4–5.2)
SODIUM SERPL-SCNC: 141 MMOL/L (ref 136–145)
WBC # SPEC AUTO: 8.4 X10(3)/MCL (ref 4.5–11)

## 2018-09-13 ENCOUNTER — HISTORICAL (OUTPATIENT)
Dept: SURGERY | Facility: HOSPITAL | Age: 23
End: 2018-09-13

## 2018-09-13 LAB — B-HCG SERPL QL: NEGATIVE

## 2019-05-01 ENCOUNTER — PATIENT MESSAGE (OUTPATIENT)
Dept: SURGERY | Facility: CLINIC | Age: 24
End: 2019-05-01

## 2019-12-28 ENCOUNTER — HOSPITAL ENCOUNTER (OUTPATIENT)
Dept: OCCUPATIONAL THERAPY | Facility: HOSPITAL | Age: 24
End: 2019-12-28
Attending: INTERNAL MEDICINE | Admitting: INTERNAL MEDICINE

## 2019-12-28 LAB
ABS NEUT (OLG): 8.47 X10(3)/MCL (ref 2.1–9.2)
ALBUMIN SERPL-MCNC: 3.7 GM/DL (ref 3.4–5)
ALBUMIN/GLOB SERPL: 1 RATIO (ref 1.1–2)
ALP SERPL-CCNC: 114 UNIT/L (ref 38–126)
ALT SERPL-CCNC: 24 UNIT/L (ref 12–78)
AST SERPL-CCNC: 21 UNIT/L (ref 15–37)
BASOPHILS # BLD AUTO: 0 X10(3)/MCL (ref 0–0.2)
BASOPHILS NFR BLD AUTO: 0 %
BILIRUB SERPL-MCNC: 0.2 MG/DL (ref 0.2–1)
BILIRUBIN DIRECT+TOT PNL SERPL-MCNC: 0.1 MG/DL (ref 0–0.5)
BILIRUBIN DIRECT+TOT PNL SERPL-MCNC: 0.1 MG/DL (ref 0–0.8)
BNP BLD-MCNC: <15 PG/ML (ref 0–100)
BUN SERPL-MCNC: 12 MG/DL (ref 7–18)
CALCIUM SERPL-MCNC: 8.7 MG/DL (ref 8.5–10.1)
CHLORIDE SERPL-SCNC: 113 MMOL/L (ref 98–107)
CO2 SERPL-SCNC: 20 MMOL/L (ref 21–32)
CREAT SERPL-MCNC: 0.89 MG/DL (ref 0.55–1.02)
EOSINOPHIL # BLD AUTO: 0.1 X10(3)/MCL (ref 0–0.9)
EOSINOPHIL NFR BLD AUTO: 0 %
ERYTHROCYTE [DISTWIDTH] IN BLOOD BY AUTOMATED COUNT: 13.3 % (ref 11.5–17)
GLOBULIN SER-MCNC: 3.6 GM/DL (ref 2.4–3.5)
GLUCOSE SERPL-MCNC: 105 MG/DL (ref 74–106)
HCO3 UR-SCNC: 19.9 MMOL/L (ref 22–26)
HCO3 UR-SCNC: 21.1 MMOL/L (ref 22–26)
HCT VFR BLD AUTO: 48.2 % (ref 37–47)
HGB BLD-MCNC: 15.8 GM/DL (ref 12–16)
LACTATE SERPL-SCNC: 3.2 MMOL/L (ref 0.4–2)
LACTATE SERPL-SCNC: 3.6 MMOL/L (ref 0.4–2)
LYMPHOCYTES # BLD AUTO: 3.7 X10(3)/MCL (ref 0.6–4.6)
LYMPHOCYTES NFR BLD AUTO: 27 %
MCH RBC QN AUTO: 28.8 PG (ref 27–31)
MCHC RBC AUTO-ENTMCNC: 32.8 GM/DL (ref 33–36)
MCV RBC AUTO: 87.8 FL (ref 80–94)
MONOCYTES # BLD AUTO: 1.2 X10(3)/MCL (ref 0.1–1.3)
MONOCYTES NFR BLD AUTO: 9 %
NEUTROPHILS # BLD AUTO: 8.47 X10(3)/MCL (ref 2.1–9.2)
NEUTROPHILS NFR BLD AUTO: 62 %
O2 HGB ARTERIAL: 90.8 % (ref 94–97)
O2 HGB ARTERIAL: 95.7 % (ref 94–97)
PCO2 BLDA: 27 MMHG (ref 35–45)
PCO2 BLDA: 30 MMHG (ref 35–45)
PH SMN: 7.43 [PH] (ref 7.35–7.45)
PH SMN: 7.5 [PH] (ref 7.35–7.45)
PLATELET # BLD AUTO: 362 X10(3)/MCL (ref 130–400)
PMV BLD AUTO: 9.9 FL (ref 9.4–12.4)
PO2 BLDA: 163 MMHG (ref 80–100)
PO2 BLDA: 97 MMHG (ref 80–100)
POC ALLENS TEST: ABNORMAL
POC ALLENS TEST: POSITIVE
POC BE: -0.5 (ref -2–3)
POC BE: -3.1 (ref -2–3)
POC CAO2: 20.8 ML/DL (ref 15.7–21.6)
POC CAO2: 21.1 ML/DL (ref 15.7–21.6)
POC CO HGB: 1.3 %
POC CO HGB: 6.1 %
POC CO2: 20.8 MMOL/L (ref 22–27)
POC CO2: 21.9 MMOL/L (ref 22–27)
POC IONIZED CALCIUM: 1.11 MMOL/L (ref 1.12–1.23)
POC IONIZED CALCIUM: 1.16 MMOL/L (ref 1.12–1.23)
POC MET HGB: 1 % (ref 0.4–1.5)
POC MET HGB: 1.3 % (ref 0.4–1.5)
POC SAMPLESOURCE: ABNORMAL
POC SAMPLESOURCE: ABNORMAL
POC SATURATED O2: 97.7 % (ref 96–97)
POC SATURATED O2: 99.6 % (ref 96–97)
POC SITE: ABNORMAL
POC SITE: ABNORMAL
POC THB: 15.6 GM/DL (ref 12–16)
POC THB: 16.1 GM/DL (ref 12–16)
POC TREATMENT: ABNORMAL
POC TREATMENT: ABNORMAL
POTASSIUM BLD-SCNC: 2.9 MMOL/L (ref 3.6–5)
POTASSIUM BLD-SCNC: 4.1 MMOL/L (ref 3.6–5)
POTASSIUM SERPL-SCNC: 3.7 MMOL/L (ref 3.5–5.1)
PROT SERPL-MCNC: 7.3 GM/DL (ref 6.4–8.2)
RBC # BLD AUTO: 5.49 X10(6)/MCL (ref 4.2–5.4)
SETTING 1 ABG: ABNORMAL
SETTING 1 ABG: ABNORMAL
SETTING 2 ABG: ABNORMAL
SETTING 3 ABG: ABNORMAL
SODIUM BLD-SCNC: 139 MMOL/L (ref 137–145)
SODIUM BLD-SCNC: 140 MMOL/L (ref 137–145)
SODIUM SERPL-SCNC: 143 MMOL/L (ref 136–145)
TROPONIN I SERPL-MCNC: <0.02 NG/ML (ref 0.02–0.49)
TSH SERPL-ACNC: 2.74 MIU/L (ref 0.36–3.74)
WBC # SPEC AUTO: 13.6 X10(3)/MCL (ref 4.5–11.5)

## 2020-02-07 ENCOUNTER — HISTORICAL (OUTPATIENT)
Dept: LAB | Facility: HOSPITAL | Age: 25
End: 2020-02-07

## 2020-02-07 LAB
ABS NEUT (OLG): 5.85 X10(3)/MCL (ref 2.1–9.2)
BASOPHILS # BLD AUTO: 0.03 X10(3)/MCL (ref 0–0.2)
BASOPHILS NFR BLD AUTO: 0.3 % (ref 0–1)
DEPRECATED CALCIDIOL+CALCIFEROL SERPL-MC: 22.3 NG/ML (ref 30–80)
EOSINOPHIL # BLD AUTO: 0.08 X10(3)/MCL (ref 0–0.9)
EOSINOPHIL NFR BLD AUTO: 0.8 % (ref 0–6.4)
ERYTHROCYTE [DISTWIDTH] IN BLOOD BY AUTOMATED COUNT: 13.2 % (ref 11.5–17)
HCT VFR BLD AUTO: 47.1 % (ref 37–47)
HGB BLD-MCNC: 15.8 GM/DL (ref 12–16)
IMM GRANULOCYTES # BLD AUTO: 0.06 10*3/UL (ref 0–0.02)
IMM GRANULOCYTES NFR BLD AUTO: 0.6 % (ref 0–0.43)
LYMPHOCYTES # BLD AUTO: 4.03 X10(3)/MCL (ref 0.6–4.6)
LYMPHOCYTES NFR BLD AUTO: 38.2 % (ref 16–44)
MCH RBC QN AUTO: 29.4 PG (ref 27–31)
MCHC RBC AUTO-ENTMCNC: 33.5 GM/DL (ref 33–36)
MCV RBC AUTO: 87.7 FL (ref 80–94)
MONOCYTES # BLD AUTO: 0.51 X10(3)/MCL (ref 0.1–1.3)
MONOCYTES NFR BLD AUTO: 4.8 % (ref 4–12.1)
NEUTROPHILS # BLD AUTO: 5.85 X10(3)/MCL (ref 2.1–9.2)
NEUTROPHILS NFR BLD AUTO: 55.3 % (ref 43–73)
NRBC BLD AUTO-RTO: 0 % (ref 0–0.2)
PLATELET # BLD AUTO: 279 X10(3)/MCL (ref 130–400)
PMV BLD AUTO: 9.5 FL (ref 7.4–10.4)
RBC # BLD AUTO: 5.37 X10(6)/MCL (ref 4.2–5.4)
WBC # SPEC AUTO: 10.6 X10(3)/MCL (ref 4.5–11.5)

## 2020-02-17 ENCOUNTER — OFFICE VISIT (OUTPATIENT)
Dept: SURGERY | Facility: CLINIC | Age: 25
End: 2020-02-17
Payer: MEDICAID

## 2020-02-17 VITALS
HEIGHT: 62 IN | TEMPERATURE: 98 F | SYSTOLIC BLOOD PRESSURE: 128 MMHG | HEART RATE: 101 BPM | DIASTOLIC BLOOD PRESSURE: 91 MMHG | BODY MASS INDEX: 43.69 KG/M2 | WEIGHT: 237.44 LBS

## 2020-02-17 DIAGNOSIS — Z98.84 S/P LAPAROSCOPIC SLEEVE GASTRECTOMY: Primary | ICD-10-CM

## 2020-02-17 DIAGNOSIS — K75.81 NASH (NONALCOHOLIC STEATOHEPATITIS): ICD-10-CM

## 2020-02-17 DIAGNOSIS — E78.5 HYPERLIPIDEMIA, UNSPECIFIED HYPERLIPIDEMIA TYPE: ICD-10-CM

## 2020-02-17 DIAGNOSIS — G47.33 OSA (OBSTRUCTIVE SLEEP APNEA): ICD-10-CM

## 2020-02-17 DIAGNOSIS — E66.01 MORBID OBESITY WITH BMI OF 40.0-44.9, ADULT: ICD-10-CM

## 2020-02-17 PROCEDURE — 99999 PR PBB SHADOW E&M-EST. PATIENT-LVL III: CPT | Mod: PBBFAC,,, | Performed by: SURGERY

## 2020-02-17 PROCEDURE — 99999 PR PBB SHADOW E&M-EST. PATIENT-LVL III: ICD-10-PCS | Mod: PBBFAC,,, | Performed by: SURGERY

## 2020-02-17 PROCEDURE — 99204 OFFICE O/P NEW MOD 45 MIN: CPT | Mod: S$PBB,,, | Performed by: SURGERY

## 2020-02-17 PROCEDURE — 99213 OFFICE O/P EST LOW 20 MIN: CPT | Mod: PBBFAC | Performed by: SURGERY

## 2020-02-17 PROCEDURE — 99204 PR OFFICE/OUTPT VISIT, NEW, LEVL IV, 45-59 MIN: ICD-10-PCS | Mod: S$PBB,,, | Performed by: SURGERY

## 2020-02-17 RX ORDER — PANTOPRAZOLE SODIUM 40 MG/1
TABLET, DELAYED RELEASE ORAL
COMMUNITY

## 2020-02-17 RX ORDER — BUPROPION HYDROCHLORIDE 100 MG/1
100 TABLET ORAL DAILY
COMMUNITY
Start: 2020-01-31

## 2020-02-17 RX ORDER — OMEPRAZOLE 40 MG/1
CAPSULE, DELAYED RELEASE ORAL
COMMUNITY

## 2020-02-17 RX ORDER — ONDANSETRON 4 MG/1
TABLET, ORALLY DISINTEGRATING ORAL
COMMUNITY

## 2020-02-17 RX ORDER — FAMOTIDINE 40 MG/1
TABLET, FILM COATED ORAL
COMMUNITY
Start: 2020-02-06

## 2020-02-17 RX ORDER — FLUVOXAMINE MALEATE 25 MG/1
25 TABLET ORAL 2 TIMES DAILY
COMMUNITY
Start: 2020-01-30

## 2020-02-17 RX ORDER — METOCLOPRAMIDE 10 MG/1
10 TABLET ORAL 2 TIMES DAILY
COMMUNITY
Start: 2020-01-21

## 2020-02-17 NOTE — PROGRESS NOTES
History & Physical    SUBJECTIVE:     History of Present Illness:  Patient is a 24 y.o. female referred for GERD/gastric ulcerations/hiatal hernia with a history of sleeve gastrectomy by Dr. Fernandez at [facility] in 2014.  She reports bad reflux worse at night while laying down.  She has tried multiple medications and continues to have symptoms despite trying different regimens.  She also reports a ulceration has been noted along with a hiatal hernia that has failed to heal.  She has had multiple EGDs with Dr. Cody.(outside records not available at time of appointment) who recommended she look into the linx procedure.    Chief Complaint   Patient presents with    Consult       Review of patient's allergies indicates:  No Known Allergies    Current Outpatient Medications   Medication Sig Dispense Refill    atorvastatin (LIPITOR) 20 MG tablet Take 1 tablet by mouth once daily at 6am.      benzonatate (TESSALON) 200 MG capsule 1 capsule 3 (three) times daily as needed.      buPROPion (WELLBUTRIN) 100 MG tablet Take 100 mg by mouth once daily.      famotidine (PEPCID) 40 MG tablet TAKE 1 TABLET ONCE A DAY FOR BREAKTHROUGH HEARTBURN.      FLOVENT  mcg/actuation inhaler Inhale 2 puffs into the lungs 2 (two) times daily.  3    fluticasone (FLONASE) 50 mcg/actuation nasal spray 1 spray once daily at 6am.  3    fluvoxaMINE (LUVOX) 25 MG tablet Take 25 mg by mouth 2 (two) times daily.      loratadine (CLARITIN) 10 mg tablet 1 tablet once daily at 6am.      lorazepam (ATIVAN) 0.5 MG tablet 1 tablet 2 (two) times daily. Noon and bedtime      metoclopramide HCl (REGLAN) 10 MG tablet Take 10 mg by mouth 2 (two) times daily.      mometasone-formoterol (DULERA) 200-5 mcg/actuation inhaler Inhale 2 puffs into the lungs 2 (two) times daily. Controller      montelukast (SINGULAIR) 10 mg tablet 1 tablet once daily at 6am.      omeprazole (PRILOSEC) 40 MG capsule omeprazole 40 mg capsule,delayed release   Take 1  capsule twice a day by oral route as directed for 120 days.      ondansetron (ZOFRAN-ODT) 4 MG TbDL ondansetron 4 mg disintegrating tablet      pantoprazole (PROTONIX) 40 MG tablet pantoprazole 40 mg tablet,delayed release      sertraline (ZOLOFT) 50 MG tablet Take 100 mg by mouth nightly.   1    VENTOLIN HFA 90 mcg/actuation inhaler 1 puff daily as needed.  3    zolpidem (AMBIEN) 10 mg Tab Take 10 mg by mouth every evening.  1    predniSONE (DELTASONE) 20 MG tablet 1 tablet once daily at 6am.      quetiapine (SEROQUEL) 50 MG tablet 2 tablets every evening.      ranitidine (ZANTAC) 150 MG tablet Take 300 mg by mouth nightly.      VITAMIN D2 50,000 unit capsule 1 capsule once a week.       No current facility-administered medications for this visit.        Past Medical History:   Diagnosis Date    Anxiety     Arthritis     Bulging lumbar disc     Depression     Fatty liver disease, nonalcoholic     GERD (gastroesophageal reflux disease)     Hyperlipidemia     Hypertension     Hypothyroidism     Kidney stones     Migraine headache     TOPHER on CPAP     PCOS (polycystic ovarian syndrome)      Past Surgical History:   Procedure Laterality Date    CHOLECYSTECTOMY      GASTRECTOMY      RENAL ARTERY STENT       Family History   Problem Relation Age of Onset    Hyperlipidemia Mother     Obesity Father     Diabetes Father     Hyperlipidemia Father     Sleep apnea Father     Cancer Maternal Grandfather     Cancer Paternal Grandfather     Heart disease Paternal Grandfather     Diabetes Paternal Grandfather     Obesity Paternal Grandfather      Social History     Tobacco Use    Smoking status: Never Smoker    Smokeless tobacco: Never Used   Substance Use Topics    Alcohol use: No    Drug use: No        Review of Systems:  Review of Systems   Constitutional: Negative for activity change, appetite change, chills, diaphoresis, fatigue, fever and unexpected weight change.   HENT: Negative for  "congestion, dental problem, rhinorrhea and sore throat.    Eyes: Negative for visual disturbance.   Respiratory: Negative for cough, chest tightness, shortness of breath and wheezing.    Cardiovascular: Negative for chest pain, palpitations and leg swelling.   Gastrointestinal: Negative for abdominal distention, abdominal pain, constipation, diarrhea, nausea and vomiting.        Reflux/heartburn   Endocrine: Negative for cold intolerance, heat intolerance, polydipsia, polyphagia and polyuria.   Genitourinary: Negative for difficulty urinating, dysuria, frequency, hematuria and urgency.   Musculoskeletal: Negative for arthralgias, gait problem, myalgias and neck pain.   Skin: Negative for color change, pallor, rash and wound.   Neurological: Negative for dizziness, syncope, weakness, light-headedness, numbness and headaches.   Hematological: Negative for adenopathy. Does not bruise/bleed easily.   Psychiatric/Behavioral: Negative for confusion, decreased concentration and sleep disturbance. The patient is not nervous/anxious.        OBJECTIVE:     Vital Signs (Most Recent)  Temp: 98.4 °F (36.9 °C) (02/17/20 0847)  Pulse: 101 (02/17/20 0847)  BP: (!) 128/91 (02/17/20 0847)  5' 2" (1.575 m)  107.7 kg (237 lb 7 oz)     Physical Exam:  Physical Exam   Constitutional: She is oriented to person, place, and time. She appears well-developed and well-nourished. No distress.   HENT:   Head: Normocephalic and atraumatic.   Right Ear: External ear normal.   Left Ear: External ear normal.   Eyes: Pupils are equal, round, and reactive to light. Conjunctivae and EOM are normal. No scleral icterus.   Neck: Normal range of motion. Neck supple. No tracheal deviation present. No thyromegaly present.   Cardiovascular: Normal rate, regular rhythm, normal heart sounds and intact distal pulses. Exam reveals no gallop and no friction rub.   No murmur heard.  Pulmonary/Chest: Effort normal and breath sounds normal. No respiratory distress. " She has no wheezes. She has no rales. She exhibits no tenderness.   Abdominal: Soft. Bowel sounds are normal. She exhibits no distension. There is no tenderness. No hernia.   Well-healed laparoscopic surgical incisions   Musculoskeletal: Normal range of motion. She exhibits no edema, tenderness or deformity.   Lymphadenopathy:     She has no cervical adenopathy.   Neurological: She is alert and oriented to person, place, and time.   Skin: Skin is warm and dry. No rash noted. She is not diaphoretic. No erythema. No pallor.   Psychiatric: She has a normal mood and affect. Her behavior is normal. Judgment and thought content normal.   Vitals reviewed.        ASSESSMENT/PLAN:     24-year-old female with severe reflux history of sleeve gastrectomy    PLAN:Plan     Discussed with patient that she would need to see a surgeon who performs gastric bypass is or the linx procedure. Information of group in [facility] that performs these procedures given to patient  Continue medical management of her reflux

## 2020-02-17 NOTE — LETTER
February 17, 2020      Michael Cody MD  [address] [place] [id number]           [facility]  [address]  [place] [address]  Phone: [phone]  Fax: [phone]          Patient: Ivana Ibarra   MR Number: [id number]   YOB: 1995   Date of Visit: 2/17/2020       Dear Dr. Michael Cody:    Thank you for referring Ivana Ibarra to me for evaluation. Attached you will find relevant portions of my assessment and plan of care.    If you have questions, please do not hesitate to call me. I look forward to following Ivana Ibarra along with you.    Sincerely,    Kavin Sellers MD    Enclosure  CC:  No Recipients    If you would like to receive this communication electronically, please contact [email or web address] or [phone] to request more information on [employer] access.    For providers and/or their staff who would like to refer a patient to [facility], please contact us through our one-stop-shop provider referral line, [facility], at [phone].    If you feel you have received this communication in error or would no longer like to receive these types of communications, please e-mail [email or web address]

## 2020-02-18 ENCOUNTER — HISTORICAL (OUTPATIENT)
Dept: RADIOLOGY | Facility: HOSPITAL | Age: 25
End: 2020-02-18

## 2020-03-03 ENCOUNTER — TELEPHONE (OUTPATIENT)
Dept: SURGERY | Facility: CLINIC | Age: 25
End: 2020-03-03

## 2020-03-03 NOTE — TELEPHONE ENCOUNTER
----- Message from Kylah Hope sent at 3/3/2020 10:12 AM CST -----  Contact: Annamaria borden/ Dr. Baptiste  Calling concerning getting information as to what provider patient needs to see. Please call Annamaria @ [phone] ext [phone]. Thanks, eloise

## 2020-05-21 ENCOUNTER — HISTORICAL (OUTPATIENT)
Dept: RADIOLOGY | Facility: HOSPITAL | Age: 25
End: 2020-05-21

## 2020-06-16 ENCOUNTER — HISTORICAL (OUTPATIENT)
Dept: LAB | Facility: HOSPITAL | Age: 25
End: 2020-06-16

## 2020-10-19 ENCOUNTER — HISTORICAL (OUTPATIENT)
Dept: LAB | Facility: HOSPITAL | Age: 25
End: 2020-10-19

## 2020-10-28 ENCOUNTER — HISTORICAL (OUTPATIENT)
Dept: RADIOLOGY | Facility: HOSPITAL | Age: 25
End: 2020-10-28

## 2020-11-11 ENCOUNTER — HISTORICAL (OUTPATIENT)
Dept: LAB | Facility: HOSPITAL | Age: 25
End: 2020-11-11

## 2020-11-11 LAB
ABS NEUT (OLG): 4.78 X10(3)/MCL (ref 2.1–9.2)
ALBUMIN SERPL-MCNC: 3.5 GM/DL (ref 3.5–5)
ALBUMIN/GLOB SERPL: 1.1 RATIO (ref 1.1–2)
ALP SERPL-CCNC: 82 UNIT/L (ref 40–150)
ALT SERPL-CCNC: 12 UNIT/L (ref 0–55)
ANTINUCLEAR ANTIBODY SCREEN (OHS): NEGATIVE
AST SERPL-CCNC: 14 UNIT/L (ref 5–34)
BASOPHILS # BLD AUTO: 0.02 X10(3)/MCL (ref 0–0.2)
BASOPHILS NFR BLD AUTO: 0.2 % (ref 0–1)
BILIRUB SERPL-MCNC: 0.4 MG/DL (ref 0.2–1.2)
BILIRUBIN DIRECT+TOT PNL SERPL-MCNC: 0.1 MG/DL (ref 0–0.5)
BILIRUBIN DIRECT+TOT PNL SERPL-MCNC: 0.3 MG/DL (ref 0–0.8)
BUN SERPL-MCNC: 12.2 MG/DL (ref 7–18.7)
CALCIUM SERPL-MCNC: 8.9 MG/DL (ref 8.4–10.2)
CHLORIDE SERPL-SCNC: 104 MMOL/L (ref 98–107)
CO2 SERPL-SCNC: 25 MMOL/L (ref 22–29)
CREAT SERPL-MCNC: 0.68 MG/DL (ref 0.57–1.11)
DSDNA ANTIBODY (OHS): NEGATIVE
EOSINOPHIL # BLD AUTO: 0.05 X10(3)/MCL (ref 0–0.9)
EOSINOPHIL NFR BLD AUTO: 0.5 % (ref 0–6.4)
ERYTHROCYTE [DISTWIDTH] IN BLOOD BY AUTOMATED COUNT: 13 % (ref 11.5–17)
GLOBULIN SER-MCNC: 3.2 GM/DL (ref 2.4–3.5)
GLUCOSE SERPL-MCNC: 82 MG/DL (ref 74–100)
HCT VFR BLD AUTO: 47 % (ref 37–47)
HGB BLD-MCNC: 15.7 GM/DL (ref 12–16)
IMM GRANULOCYTES # BLD AUTO: 0.07 10*3/UL (ref 0–0.02)
IMM GRANULOCYTES NFR BLD AUTO: 0.8 % (ref 0–0.43)
LYMPHOCYTES # BLD AUTO: 3.74 X10(3)/MCL (ref 0.6–4.6)
LYMPHOCYTES NFR BLD AUTO: 40.3 % (ref 16–44)
MAGNESIUM SERPL-MCNC: 2.06 MG/DL (ref 1.6–2.6)
MCH RBC QN AUTO: 29.3 PG (ref 27–31)
MCHC RBC AUTO-ENTMCNC: 33.4 GM/DL (ref 33–36)
MCV RBC AUTO: 87.9 FL (ref 80–94)
MONOCYTES # BLD AUTO: 0.61 X10(3)/MCL (ref 0.1–1.3)
MONOCYTES NFR BLD AUTO: 6.6 % (ref 4–12.1)
NEUTROPHILS # BLD AUTO: 4.78 X10(3)/MCL (ref 2.1–9.2)
NEUTROPHILS NFR BLD AUTO: 51.6 % (ref 43–73)
NRBC BLD AUTO-RTO: 0 % (ref 0–0.2)
PHOSPHATE SERPL-MCNC: 3.2 MG/DL (ref 2.3–4.7)
PLATELET # BLD AUTO: 320 X10(3)/MCL (ref 130–400)
PMV BLD AUTO: 9.2 FL (ref 7.4–10.4)
POTASSIUM SERPL-SCNC: 3.8 MMOL/L (ref 3.5–5.1)
PROT SERPL-MCNC: 6.7 GM/DL (ref 6.4–8.3)
RBC # BLD AUTO: 5.35 X10(6)/MCL (ref 4.2–5.4)
RHEUMATOID FACT SERPL-ACNC: 18 IU/ML
SODIUM SERPL-SCNC: 139 MMOL/L (ref 136–145)
T3FREE SERPL-MCNC: 2.7 PG/ML (ref 1.71–3.71)
T4 FREE SERPL-MCNC: 1.07 NG/DL (ref 0.7–1.48)
TSH SERPL-ACNC: 3.46 UIU/ML (ref 0.35–4.94)
WBC # SPEC AUTO: 9.3 X10(3)/MCL (ref 4.5–11.5)

## 2020-11-12 LAB — GRAM STN SPEC: NORMAL

## 2020-11-13 LAB — FINAL CULTURE: NORMAL

## 2021-05-12 ENCOUNTER — PATIENT MESSAGE (OUTPATIENT)
Dept: RESEARCH | Facility: HOSPITAL | Age: 26
End: 2021-05-12

## 2022-01-27 ENCOUNTER — HISTORICAL (OUTPATIENT)
Dept: PHYSICAL THERAPY | Facility: HOSPITAL | Age: 27
End: 2022-01-27

## 2022-02-18 ENCOUNTER — HISTORICAL (OUTPATIENT)
Dept: PHYSICAL THERAPY | Facility: HOSPITAL | Age: 27
End: 2022-02-18

## 2022-02-24 ENCOUNTER — HISTORICAL (OUTPATIENT)
Dept: PHYSICAL THERAPY | Facility: HOSPITAL | Age: 27
End: 2022-02-24

## 2022-02-28 ENCOUNTER — HISTORICAL (OUTPATIENT)
Dept: PHYSICAL THERAPY | Facility: HOSPITAL | Age: 27
End: 2022-02-28

## 2022-03-02 ENCOUNTER — HISTORICAL (OUTPATIENT)
Dept: PHYSICAL THERAPY | Facility: HOSPITAL | Age: 27
End: 2022-03-02

## 2022-04-09 ENCOUNTER — HISTORICAL (OUTPATIENT)
Dept: ADMINISTRATIVE | Facility: HOSPITAL | Age: 27
End: 2022-04-09
Payer: MEDICAID

## 2022-04-27 VITALS
WEIGHT: 206.81 LBS | HEIGHT: 63 IN | OXYGEN SATURATION: 97 % | SYSTOLIC BLOOD PRESSURE: 116 MMHG | DIASTOLIC BLOOD PRESSURE: 83 MMHG | BODY MASS INDEX: 36.64 KG/M2

## 2022-04-30 NOTE — ED PROVIDER NOTES
Patient:   Ivana Ibarra            MRN: [id number]            FIN: [id number]               Age:   24 years     Sex:  Female     :  1995   Associated Diagnoses:   Asthma exacerbation; SOB - Shortness of breath; Respiratory failure with hypoxia   Author:   Harjit Mina MD      Basic Information   Time seen: Immediately upon arrival.   History source: EMS.   Arrival mode: Ambulance.   History limitation: Clinical condition.      History of Present Illness   The patient presents with 24 year old CF with a hx of COPD and asthma presents to the ED via EMS due to SOB. Per EMS, pt has been suffering from respiratory infection for months and has had SOB for several weeks and is on steroids and breathing treatments and has been intubated multiple times..  The onset was SOB has been going on for weeks.  The course/duration of symptoms is improving.  Degree at onset moderate.  Degree at present severe.  The Exacerbating factors is unknown.  The Relieving factors is oxygen.  Risk factors consist of chronic obstructive pulmonary disease and asthma.  Prior episodes: frequent.  Therapy today: beta-agonist (albuterol, DuoNeb) and emergency medical services.  Associated symptoms: unknown.        Review of Systems             Additional review of systems information: Unable to obtain due to: Clinical condition.      Health Status   Allergies: No known allergies.   Medications: Per nurse's notes.      Past Medical/ Family/ Social History   Medical history:    Resolved  Caffeine user ([id number]): Onset on 2018 at 22 years.  Resolved on 2018 at 22 years.  Comments:  2018 CDT 18:58 CDT - SYSTEM  Problem added by Discern Expert  Urinary tract infection in pregnancy ([id number]): Onset on 2018 at 22 years.  Resolved.  Comments:  2018 CDT 19:18 CDT - SYSTEM  Problem added by Discern Expert  Premature labor ([id number]): Onset on 2018 at 22 years.  Resolved on 2018 at 22  years.  Comments:  7/5/2018 CDT 19:18 CDT - SYSTEM  Problem added by Discern Expert  Maternal tobacco use ([id number]): Onset on 7/5/2018 at 22 years.  Resolved on 7/5/2018 at 22 years.  Comments:  7/5/2018 CDT 19:18 CDT - SYSTEM  Problem added by Discern Expert  Infant death ([id number]): Onset on 12/22/2017 at 21 years.  Resolved on 12/22/2017 at 21 years.  Comments:  12/22/2017 CST 13:43 CST - SYSTEM  Problem added by Discern Expert  Pulmonary disease (80106858): Onset on 12/22/2017 at 21 years.  Resolved on 12/22/2017 at 21 years.  Comments:  12/22/2017 CST 13:43 CST - SYSTEM  Problem added by Discern Expert  Chronic hypertension in obstetric context (51220103): Onset on 12/22/2017 at 21 years.  Resolved on 12/22/2017 at 21 years.  Comments:  12/22/2017 CST 13:43 CST - SYSTEM  Problem added by Discern Expert  Pregnant ([id number]): Onset on 10/21/2017 at 21 years.  Resolved on 7/16/2018 at 22 years.  Pregnant ([id number]): Onset on 11/27/2016 at 20 years.  Resolved in 2017 at 21 years.  Pregnant ([id number]): Onset on 12/4/2015 at 19 years.  Resolved in 2016 at 20 years.  Pregnant ([id number]): Onset on 3/17/2015 at 19 years.  Resolved on 12/22/2015 at 19 years.  Pregnant ([id number]): Onset on 12/4/2012 at 16 years.  Resolved in 2013 at 17 years.  Post-traumatic stress disorder (PTSD) (309.81):  Resolved.  Anxiety (300.00):  Resolved on 12/25/2015 at 19 years.  Bipolar affective disorder, manic (296.40):  Resolved.  Depression (311):  Resolved.  NIDDM (250.00):  Resolved.  Hypothyroid (244.9):  Resolved.  Acute URI (48497700):  Resolved on 12/25/2015 at 19 years.  High risk pregnancy (61396122):  Resolved on 12/25/2015 at 19 years.  Rhinorrhea ([id number]):  Resolved on 12/25/2015 at 19 years.  28 weeks gestation of pregnancy (448424471):  Resolved.  Encounter for ultrasound to check fetal growth (422965599):  Resolved.  FH: gastric ulcer (789231407):  Resolved.  Gastroesophageal reflux in  pregnancy (9153487846):  Resolved.  32 weeks gestation of pregnancy (20147143):  Resolved.  Decreased fetal movements in third trimester (844150343):  Resolved..   Surgical history:    Lap Salpingectomy (Bilateral) on 9/13/2018 at 22 Years.  Comments:  9/13/2018 11:04 KERRY Avilez RN, Emily VELIZ  auto-populated from documented surgical case  Esophagogastroduodenoscopy on 10/18/2017 at 21 Years.  Comments:  10/18/2017 13:47 KERRY Darnell RN, Marisol LOVELL  auto-populated from documented surgical case  GASTRIC SLEEVE on 12/18/2014 at 18 Years.  Cholecystectomy; (10551).  BTL..   Family history:    Father  COPD (chronic obstructive pulmonary disease).  Diabetes mellitus type 2  Hyperlipidemia.  Mother  Hyperthyroidism.  Cardiac arrhythmia.  Hyperlipidemia.  Asthma.  .   Social history: Alcohol use: Denies, Tobacco use: Denies, Drug use: Denies, Occupation: Unemployed, Family/social situation: Lives with significant other.      Physical Examination               Vital Signs   Vital Signs   12/28/2019 1:26 CST      Temperature Oral          36.8 DegC                             Temperature Oral (calculated)             98.24 DegF                             Peripheral Pulse Rate     126 bpm  HI                             Respiratory Rate          40 br/min  HI                             SpO2                      97 %                             Oxygen Therapy            All-Purpose nebulizer                             Systolic Blood Pressure   162 mmHg  HI                             Diastolic Blood Pressure  105 mmHg  HI  .   Measurements   12/28/2019 1:26 CST      Weight Dosing             90 kg                             Weight Measured and Calculated in Lbs     198.41 lb                             Weight Estimated          90 kg                             Height/Length Dosing      164 cm                             Height/Length Estimated   164 cm                             Body Mass Index Estimated 33.46 kg/m2  .    Basic Oxygen Information   12/28/2019 1:26 CST      SpO2                      97 %                             Oxygen Therapy            All-Purpose nebulizer  .   General:  No acute distress, not alert   Skin:  Warm, dry, no rash   Head:  Normocephalic, atraumatic   Neck:  Supple, trachea midline, no JVD   Eye:  Extraocular movements are intact, normal conjunctiva, vision unchanged, Pupil: Dilated (somewhat).    Ears, nose, mouth and throat:  Tympanic membranes clear, oral mucosa moist, no pharyngeal erythema or exudate.    Cardiovascular:  Regular rate and rhythm, No murmur, Edema: Bilateral, lower extremity, pitting.    Respiratory:  Breath sounds are equal, Symmetrical chest wall expansion, Respirations: Tachypneic, respiratory distress severe, Breath sounds: Wheezes present (inspiratory wheezes, expiratory wheezes, diffuse).    Chest wall:  No tenderness.   Back:  Normal range of motion   Musculoskeletal:  Normal ROM, normal strength, no tenderness.    Gastrointestinal:  Soft, Nontender, Non distended, Normal bowel sounds, No organomegaly   Neurological:  Alert and oriented to person, place, time, and situation, CN II-XII intact, normal sensory observed, normal motor observed, normal speech observed, Hartman coma scale: Eyes open 4 /4, verbal response 4 /5, motor response 6 /6, total score 14.    Psychiatric:  Cooperative, appropriate mood & affect, normal judgment.             Medical Decision Making   Differential Diagnosis:  Pneumonia, bronchitis, pulmonary embolism, asthma, pulmonary edema, pleural effusion, acute myocardial infarction.    Rationale:  24-year-old female with a history of asthma and requiring prior intubations multiple times here with respiratory distress over the last several days also stating she had a respiratory infection for the last few months.  She arrives it tended, tachypneic with normal sats on oxygen mask, hypertensive, tachycardic, afebrile.  Exam as above significant wheezing.   Patient has been given Solu-Medrol and multiple breathing in and on arrival patient was placed on BiPAP and given magnesium sulfate as well as continued breathing treatments.  Single view chest x-ray without evidence of acute cardiopulmonary process.  Labs returned with mild leukocytosis and elevated lactate 3.6 and a single dose of Rocephin was given.  ABG with decreased CO2 and elevated pH secondary to significant tachypnea.  Patient admitted for further care to hospitalist.  Status significantly improved as well as respiratory status prior to admission..   Documents reviewed:  Emergency department nurses' notes.   Orders  Launch Order Profile (Selected)   Inpatient Orders  Ordered  BIPAP: 12/28/19 1:30:00 CST, IPAP: 10, EPAP/CPAP: 5  O2 Therapy: 12/28/19 1:36:42 CST  Ordered (Exam Completed)  CXR 1 View: Stat, 12/28/19 1:51:00 CST, Dyspnea, None, Ambulatory, Patient Has IV?, Rad Type, Not Scheduled, 12/28/19 1:51:00 CST  Ordered (In-Lab)  CMP: Stat collect, 12/28/19 1:51:00 CST, Blood, Stop date 12/28/19 1:51:00 CST, Lab Collect, Print Label By Order Location, 12/28/19 1:51:00 CST  Lactic Acid: Stat collect, 12/28/19 1:59:00 CST, Blood, Stop date 12/28/19 1:59:00 CST, Lab Collect, Print Label By Order Location, 12/28/19 1:59:00 CST  Troponin-I: Stat collect, 12/28/19 1:51:00 CST, Blood, Stop date 12/28/19 1:51:00 CST, Lab Collect, Print Label By Order Location, 12/28/19 1:51:00 CST  Completed  ABG Adult RT: Stat collect, Arterial Blood, 12/28/19 1:43:00 CST, Once, Stop date 12/28/19 1:43:00 CST  Automated Diff: Stat collect, 12/28/19 1:36:00 CST, Blood, Collected, Stop date 12/28/19 1:36:00 CST, Lab Collect, Print Label By Order Location, 12/28/19 1:51:00 CST  CBC - includes Diff: Stat collect, 12/28/19 1:51:00 CST, Blood, Stop date 12/28/19 1:51:00 CST, Lab Collect, Print Label By Order Location, 12/28/19 1:51:00 CST  EKG Adult 12 Lead: 12/28/19 2:23:00 CST, Once, 12/28/19 2:23:00 CST, Ambulatory, Patient Has  IV, Standard Precautions, -1, -1, 12/28/19 2:23:00 CST  Magnesium Sulfate 1gm/100ml IVPB (Premix): 1 gm, form: Infusion, IV Piggyback, Once, Infuse over: 1 hr, first dose 12/28/19 1:36:00 CST, stop date 12/28/19 1:36:00 CST, STAT  POC BNP iSTAT request: Blood, Stat collect, 12/28/19 1:51:00 CST by Harjit Mina MD, Stop date 12/28/19 1:51:00 CST, Lab Collect, Print Label By Order Location  POC BNP iSTAT: Blood, Stat collect, Collected, 12/28/19 2:00:34 CST  albuterol-ipratropium 3mg-0.5 mg/3ml Sol: 3 mL, Soln-Inh, N/A, Once, Stop date 12/28/19 1:32:08 CST, Physician Stop, 12/28/19 1:32:08 CST  magnesium sulfate: 2 gm, 200 mL, Infusion, N/A, Once, Stop date 12/28/19 1:30:26 CST, Physician Stop, 12/28/19 1:30:26 CST.   Electrocardiogram:  Time 12/28/2019 01:46:00, rate 93, normal sinus rhythm, No ST-T changes, no ectopy, normal VT & QRS intervals, EP Interp.    Results review:  Lab results : Lab View   12/28/2019 2:00 CST      POC BNP iSTAT             <15 pg/mL    12/28/2019 1:45 CST      Sample ABG                arterial                             Treatment                 bipap                             Site                      Radial Rt                             pH Art                    7.500  HI                             pCO2 Art                  27.0 mmHg  LOW                             pO2 Art                   163.0 mmHg  HI                             HCO3 Art                  21.1 mmol/L  LOW                             CO2 Totl Art              21.9 mmol/L  LOW                             O2 Sat Art                99.6 %  HI                             D base                    -0.5                             THB ABG                   16.1 gm/dL  HI                             CO Hgb                    6.1 %  NA                             Met Hgb Art               1.3 %                             O2 Hgb                    90.8 %  LOW                             CaO2                      20.8  mL/dL                             Ionized Calcium           1.11 mmol/L  LOW                             Sodium RT                 140.0 mmol/L                             Potassium RT              2.90 mmol/L  LOW                             Allens                    N/A                             Setting 1 ABG             bipap 10/5 60%    12/28/2019 1:36 CST      Creatinine                0.89 mg/dL                             eGFR-AA                   >60 mL/min/1.73 m2  NA                             eGFR-LOUIS                  >60 mL/min/1.73 m2  NA                             Bili Total                0.2 mg/dL                             Bili Direct               0.10 mg/dL                             Bili Indirect             0.10 mg/dL                             AST                       21 unit/L                             ALT                       24 unit/L                             Alk Phos                  114 unit/L                             Total Protein             7.3 gm/dL                             WBC                       13.6 x10(3)/mcL  HI                             RBC                       5.49 x10(6)/mcL  HI                             Hgb                       15.8 gm/dL                             Hct                       48.2 %  HI                             Platelet                  362 x10(3)/mcL                             MCV                       87.8 fL                             MCH                       28.8 pg                             MCHC                      32.8 gm/dL  LOW                             RDW                       13.3 %                             MPV                       9.9 fL                             Abs Neut                  8.47 x10(3)/mcL                             Neutro Auto               62 %  NA                             Lymph Auto                27 %  NA                             Mono Auto                 9 %  NA                              Eos Auto                  0 %  NA                             Abs Eos                   0.1 x10(3)/mcL                             Basophil Auto             0 %  NA                             Abs Neutro                8.47 x10(3)/mcL                             Abs Lymph                 3.7 x10(3)/mcL                             Abs Mono                  1.2 x10(3)/mcL                             Abs Baso                  0.0 x10(3)/mcL  .      Impression and Plan   Diagnosis   Asthma exacerbation (DDG77-OC J45.901)   SOB - Shortness of breath (PNED [id number])   Respiratory failure with hypoxia (EIR07-WP J96.91)      Calls-Consults   -  12/28/2019 03:07:00 , Jaya WHITNEY, Zenon BRENNER    Plan   Condition: Improved, Stable.    Disposition: Admit to Inpatient Unit.    Counseled: Patient, Regarding diagnosis, Regarding diagnostic results, Regarding treatment plan, Patient indicated understanding of instructions.    Notes: IGifty, acted solely as a scribe for and in the presence of Dr. Mina who performed the service., IHarjit M.D., personally performed the history, physical exam and medical decision making; and confirmed the accuracy of the information in the transcribed note.

## 2022-04-30 NOTE — PROGRESS NOTES
Patient:   Ivana Ibarra            MRN: [id number]            FIN: [id number]               Age:   22 years     Sex:  Female     :  1995   Associated Diagnoses:   None   Author:   Lenin WHITNEY, Jonathan SHANE      Physician: LENIN  Reason for Exam: INITIAL PRENATAL VISIT, DATING ULTRASOUND  : 5  Parity: 1-0-3-1  LMP: 10/21/2017  Gestational Age: 12w5d  EDC: 2018    Examination:  JOANNA: ADEQUATE  Fetal Tone: PRESENT  Fetal Movement: PRESENT  Fetal Heart Rate: 139  Fetus: SINGLE  Presentation: VARIABLE  Placenta:       Position: ANTERIOR      Grade:     Measurement:  CRL: 6.72cm  EGA: 13w0d  EDC: 2018    Comments: 1st trimester intrauterine pregnancy measuring CRL of 6.72cm; EGA: 13w0d; and EDC of 2018 consistent with menstrual date of 2018.  FHR: 139. Viable pregnancy.  ASSIGN EDC: 2018          This document has images

## 2022-04-30 NOTE — DISCHARGE SUMMARY
* Final Report *    Nocturnist Admission H&P- CL     Patient:   Ivana Ibarra            MRN: [id number]            FIN: [id number]               Age:   21 years     Sex:  Female     :  1995   Associated Diagnoses:   None   Author:   Zenon Lake MD      DATE OF ADMIT: 10/18/2017 2:54  REFERRAL SOURCE: Marlene WHITNEY  PCP: Cici Lara MD     CHIEF COMPLAINT: vomited blood     HISTORY OF PRESENTING ILLNESS  Patient is a 21-year-old female with a past medical history of depression, NIDDM, hypothyroidism, bipolar disorder, PTSD, and a recent diagnosis of peptic ulcer disease per endoscopy 2 months ago by Dr. Cody.  She presented to the ER tonight having 2 episodes of vomiting bright red blood.  This was associated with nausea, and epigastric abdominal pain.  She reports she did not throw up any food material or bilious vomitus, and that the 2 time she threw up were both just blood.  She denies any prior history of hematemesis.  She denies any significant recent NSAID use, and has not been on any steroids recently.  She does have severe asthma for which she uses several inhalers and likely an inhaled corticosteroid.  On arrival to the ER she was tachycardic in her H&H was within normal limits.  She has had no further episodes of hematemesis since arrival.  GI was consult and Dr. Roth plans to do endoscopy in the morning and she will be nothing by mouth until this time.    REVIEW OF SYSTEMS  Except as documented, all other systems reviewed and negative    PAST MEDICAL HISTORY  Post-traumatic stress disorder (PTSD)  Bipolar affective disorder, manic  Depression  NIDDM  Hypothyroid  Hyperlipidemia  PUD/GERD    PAST SURGICAL HISTORY  Bariatric operative procedure  Cholecystectomy    FAMILY HISTORY  Reviewed, noncontributory to current condition.    SOCIAL HISTORY  Smokes tobacco daily, but denies drug or alcohol    ALLERGIES  No Known Allergies    HOME  MEDICATIONS  ALBUTEROL SUL 2.5 MG/3 ML SOLN , NEB, q6hr  AMITRIPTYLINE HCL 25 MG TAB 25 mg = 1 tab(s), Oral, qPM  ATORVASTATIN 20 MG TABLET 20 mg = 1 tab(s), Oral, Daily  DULERA 200 MCG/5 MCG INHALER 2 puff(s), INH, BID  LORATADINE 10 MG TABLET 10 mg = 1 tab(s), Oral, Daily  LORAZEPAM 0.5 MG TABLET , Oral  omeprazole 40 mg oral DR capsule 40 mg = 1 cap(s), Oral, Daily  PANTOPRAZOLE SOD DR 20 MG TAB 20 mg = 1 tab(s), Oral, Daily  QUETIAPINE FUMARATE 50 MG TAB   RANITIDINE 150 MG TABLET   SERTRALINE  MG TABLET , Oral  SPIRONOLACTONE 50 MG TABLET 50 mg = 1 tab(s), Oral, Daily  ZOLPIDEM TARTRATE 10 MG TABLET , Oral    PHYSICAL EXAM  Temp Oral     37.1 DegC  (OCT 18 00:57)  Heart Rate Peripheral   95 bpm  (OCT 18 02:04)  Resp Rate         14 br/min  (OCT 18 02:04)  SBP      124 mmHg  (OCT 18 02:04)  DBP      L 58mmHg  (OCT 18 02:04)  SpO2   L 93%  (OCT 18 02:04)  GENERAL: Morbidly obese but awake alert and oriented no acute distress mentating appropriately  HEENT: Normal cephalic atraumatic CN 2-12 intact  NECK: supple  CHEST: ctab no WRR   CVS: RRR no significant edema   ABD: obese epigastric ttp no rebound or gaurding   EXTREMITIES: no c/c/e  NEURO: non-focal   SKIN: warm dry intact     LABS  Chemistry Hematology/Coagulation   Sodium Lvl: 141 mmol/L (10/18/17 02:01:14) PT: 13 second(s) (10/18/17 01:47:06)   Potassium Lvl: 3.5 mmol/L (10/18/17 02:01:14) INR: 1 (10/18/17 01:47:06)   Chloride: 110 mmol/L High (10/18/17 02:01:14) PTT: 29.5 second(s) (10/18/17 01:47:07)   CO2: 23 mmol/L (10/18/17 02:01:14) WBC: 13.5 x10(3)/mcL High (10/18/17 01:32:00)   Calcium Lvl: 8.5 mg/dL (10/18/17 02:01:14) RBC: 5.24 x10(6)/mcL (10/18/17 01:32:00)   Glucose Lvl: 113 mg/dL High (10/18/17 02:01:14) Hgb: 15.6 gm/dL (10/18/17 01:32:00)   BUN: 16 mg/dL (10/18/17 02:01:14) Hct: 46 % (10/18/17 01:32:00)   Creatinine: 0.63 mg/dL (10/18/17 02:01:14) Platelet: 283 x10(3)/mcL (10/18/17 01:32:00)   eGFR-AA: >60 (10/18/17 02:01:17) MCV: 87.8  fL (10/18/17 01:32:00)   eGFR-LOUIS: >60 (10/18/17 02:01:18) MCH: 29.8 pg (10/18/17 01:32:00)   Bili Total: 0.2 mg/dL (10/18/17 02:01:14) MCHC: 33.9 gm/dL (10/18/17 01:32:00)   Bili Direct: 0.1 mg/dL (10/18/17 02:01:14) RDW: 12.7 % (10/18/17 01:32:00)   Bili Indirect: 0.1 mg/dL (10/18/17 02:01:14) MPV: 9.2 fL Low (10/18/17 01:32:00)   AST: 11 unit/L Low (10/18/17 02:01:14) Abs Neut: 10.1 x10(3)/mcL High (10/18/17 01:32:00)   ALT: 17 unit/L (10/18/17 02:01:14) Neutro Auto: 75 % (10/18/17 01:32:01)   Alk Phos: 98 unit/L (10/18/17 02:01:14) Lymph Auto: 19 % (10/18/17 01:32:01)   Total Protein: 6.9 gm/dL (10/18/17 02:01:14) Mono Auto: 5 % (10/18/17 01:32:01)   Albumin Lvl: 3.6 gm/dL (10/18/17 02:01:14) Eos Auto: 0 % (10/18/17 01:32:01)   Globulin: 3.3 gm/dL (10/18/17 02:01:14) Abs Eos: 0.1 x10(3)/mcL (10/18/17 01:32:01)   A/G Ratio: 1.1 (10/18/17 02:01:14) Basophil Auto: 0 % (10/18/17 01:32:01)   Lipase Lvl: 124 unit/L (10/18/17 02:01:16) Abs Neutro: 10.1 x10(3)/mcL High (10/18/17 01:32:01)    Abs Lymph: 2.5 x10(3)/mcL (10/18/17 01:32:01)    Abs Mono: 0.7 x10(3)/mcL (10/18/17 01:32:01)    Abs Baso: 0 x10(3)/mcL (10/18/17 01:32:01)   RADIOLOGY  none     ASSESSMENT  1.  Hematemesis ×2, with nausea and epigastric pain  2.  History of peptic ulcer disease diagnosed 2 months ago on endoscopy  3.  Severe persistent asthma with frequent steroid usage  4.  Depression, bipolar disorder PTSD all stable    PLAN  - Suspect she is having hematemesis secondary to her known esophageal and gastric ulcers  - We'll monitor H&H and transfuse as needed  - Nothing by mouth for planned endoscopy in the morning per Dr. Roth   - Resume home medications as appropriate  - IV protonix     ADVANCED DIRECTIVES: none fuill code  VTE PROPHYLAXIS: ambulate     Total time spent on admission: 71 minutes        Result type: History and Physical  Result date: October 18, 2017 2:52 CDT  Result status: Auth (Verified)  Result title: Stephanie  Admission H&P- CL  Performed by: Zenon Lake MD on October 18, 2017 3:03 CDT  Verified by: Zenon Lake MD on October 18, 2017 3:03 CDT  Encounter info: [id number], [facility], Inpatient, 10/18/2017 - 10/18/2017    Document has been moved by HIM Specialist.

## 2022-04-30 NOTE — DISCHARGE SUMMARY
Patient:   Ivana Ibarra            MRN: [id number]            FIN: [id number]               Age:   24 years     Sex:  Female     :  1995   Associated Diagnoses:   None   Author:   Manan Aguirre MD      Discharge Information      Discharge Summary Information   Admitted  2019   Discharged  2019   Admitting physician     Jaya WHITNEY, Zenon BRENNER     Referring physician for admission     Maye WHITNEY, Ze VAN        Physical Examination   General:  Alert and oriented, No acute distress.    Eye:  Pupils are equal, round and reactive to light, Extraocular movements are intact, Normal conjunctiva, Vision unchanged.    HENT:  Normocephalic, Normal hearing, Oral mucosa is moist, No pharyngeal erythema.    Neck:  Supple, Non-tender, No carotid bruit, No jugular venous distention, No lymphadenopathy, No thyromegaly.    Respiratory:  Lungs are clear to auscultation.    Cardiovascular:  Normal rate, Regular rhythm, No murmur, No gallop, Good pulses equal in all extremities, Normal peripheral perfusion, No edema.    Gastrointestinal:  Soft, Non-tender, Non-distended, Normal bowel sounds, No organomegaly.    Genitourinary:  No costovertebral angle tenderness.       Vital Signs (last 24 hrs)_____  Last Charted___________  Temp Oral     37 DegC  (DEC 28 11:56)  Heart Rate Peripheral   H 107bpm  (DEC 28 11:56)  Resp Rate         19 br/min  (DEC 28 11:)  SBP      100 mmHg  (DEC 28 11:56)  DBP      61 mmHg  (DEC 28 11:56)  SpO2      94 %  (DEC 28 11:)  Weight      90 kg  (DEC 28 04:24)  Height      164 cm  (DEC 28 04:24)  BMI      33.46  (DEC 28 04:)     Lymphatics:  No lymphadenopathy neck, axilla, groin.    Musculoskeletal:  Normal range of motion, Normal strength, No tenderness, No swelling, No deformity, Normal gait.    Integumentary:  Warm, Pink.    Neurologic:  Alert, Oriented, Normal sensory, Normal motor function, No focal deficits, Cranial Nerves II-XII are grossly intact, Gag  reflex normal, Normal deep tendon reflexes.    Psychiatric:  Cooperative, Appropriate mood & affect, Normal judgment, Non-suicidal.       Hospital Course   Hospital Course   Admitted from: from emergency department.     Admission disposition: admit to medical bed, admit to monitored bed.     Length of stay: days  1.     Medical management: Chief Complaint -SOB, cough, fever     24 yr old WF whose history includes COPD????, and asthma. She presented to the ED with c/o of 4 day history of SOB, nasal congestion/drainage (yellow) nonproductive cough and fever (max 99.7). Evaluated at an urgent care on 12/26 and started on zithromax and oral steroids(has not started yet) with no relief of symptoms. Reports she's been treating for an URI several times over the last few months and has been on multiple antibiotics and rounds of steroids. Previously seeing Dr. Sprague, pulmonologist, at [facility] but discharged from clinic because she was doing better. She continues to smoke at least a PPD. C/O heartburn.  VS in triage included T 98.2, P 126, R 40, B/P 162/105, O2 sats 97% on nebulizer. Labs as shown. ABG on BIPAP showed pH 7.5, pCO2 27, pO2 163Chest x-ray negative for acute findings. Placed on BIPAP in ER and continues to be on it at this time. Still SOB but feels better.  Pt was admited and placed on solumedrol, beta agonist plus bipap. Chest x rays on admission were clear. She has had several pft's with last one nl. Pt woke up this am with no complains. at this moment she is sitting at bedside waiting [facility] md for discharge. Pt will be discharge home on zpack plus prednisone 20 mgs po bid x 5 days. she already has zpak, albuterol mdi, albuterol solution, budesonide soulution,nebulizer machine.  tobacco cessation counseling was given as well as alternatives to smoking,she has been smoking for almost 3 yrs. follow up at family practice at [facility].    discharge diagnosis includes  1- asthma exacerbation  2-tobacco abuse  3- acute  hypoxemix respiratory failure  4- obesity  5- bipolar disorder  6- sirs    discharge summary greater than 35 minutes   .        Discharge Plan   Discharge Summary Plan   Discharge Status: improved.     Discharge instructions given: written discharge instructions.     Discharge disposition: discharge to home self care.     Prescriptions: written and given to patient.     Course   Well controlled.     Education and Follow-up   Counseled: patient, regarding diagnosis, regarding treatment, regarding medications.     Discharge Planning: Asthma Attack, Asthma Attack Prevention, Adult, Cough, Adult, Tobacco Use Disorder, Tobacco Use Disorder, Report to Emergency Department if symptoms return or worsen; Follow-up with PCP in 3 to 5 days.

## 2022-04-30 NOTE — PROGRESS NOTES
Patient:   Ivana Ibarra            MRN: [id number]            FIN: [id number]               Age:   22 years     Sex:  Female     :  1995   Associated Diagnoses:   None   Author:   Kerry WHITNEY, Jonathan SHANE      Basic Information   Visit type:  Not new patient evaluation.    Accompanied by:  Spouse.    Source of history:  Not self.    Referral source:  Self.    History limitation:  None.    /Para:      Para Information:   : 5   Para Term: 1   Para : 0   Para Abortions: 3   Para Livin.    Additional Information     Ethnicity/Race: not  female.        Chief Complaint   Chief complaint      Interval History   Final PEACE of 2018 determined by   Symptoms since last menstrual period:   Nausea.      Histories   Menstrual History: Menstrual cycle:: Last menstrual period  10/21/2017, moderate flow      Review of Systems   Constitutional:  Negative.    Eye:  Negative.    Ear/Nose/Mouth/Throat:  Negative.    Respiratory   Cardiovascular:  Negative.    Breast:  Negative.    Gastrointestinal:  Negative.    Genitourinary:  Negative.    Gynecologic   Hematology/Lymphatics:  Negative.    Endocrine:  Negative.    Immunologic:  Negative.    Musculoskeletal:  Negative.    Integumentary:  Negative.    Neurologic:  Negative.    Psychiatric:  Negative.    All other systems are negative      Physical Examination   Documented vital signs:          Blood Pressure: Systolic  136  mmHg, Diastolic  8683  mmHg.         Pulse: 93  beats per min, Regular.         Respiration: 29251  breaths/ min.    General:  Alert and oriented.    Eye:  Pupils are equal, round and reactive to light, Normal conjunctiva.    HENT:  Normocephalic.    Neck:  No thyromegaly, Not supple.    Respiratory:  Respirations are non-labored, Breath sounds are equal, Symmetrical chest wall expansion.    Cardiovascular:  Normal rate.    Breast:  No mass.    Gastrointestinal:  Soft.    Genitourinary:  No costovertebral  angle tenderness.    Obstetric Exam     Vulva: normal.     Perineum: intact, parous.     Cervix: parous; no lesions.     Musculoskeletal:  Normal range of motion.    Integumentary:  Warm.    Neurologic:  Alert.    Psychiatric:  Not cooperative.       Review / Management   Condition:  Stable.    Obesity  Chronic Hypertension: controlled  Asthma: controlled  Hypothyroidism:  subclinical; no medications      Impression and Plan   Patient Instructions:       Limitations: Caffeine consumption.    Teaching physician note:          Participation: I personally saw and examined patient separately from the resident.       Time Spent   Total encounter (face to face) time: 45  minutes.   .

## 2022-04-30 NOTE — CONSULTS
Patient:   Ivana Ibarra            MRN: [id number]            FIN: [id number]               Age:   21 years     Sex:  Female     :  1995   Associated Diagnoses:   None   Author:   Rach Harris PA-C      Chief Complaint   We were asked by Dr. Lake to evaluate this patient for UGIB.      History of Present Illness   Ms. Ibarra is a 20 yo CF unknown to our group (known to Dr. Cody) with a pmhx of depression, NIDDM, hypothyroidism, bipolar disorder, PTSD, asthma, and hx of gastric sleeve (2014 at [facility]).  Also recent dx of PUD per endoscopy about 2-3 months ago by Dr. Cody after having pyrosis.  Patient presented to ED overnight after vomiting BRB x2.  On presenation, normotensive, tachycardic with HR 13, Hgb 15.6, BUN 15/Cr 0.63.  Patient states that she has been having epigastric and RUQ abdominal pain, described as a burning for the last month and a half.  States pyrosis has been better since taking protonix once daily with zantac once daily per Dr. Cody instruction.  Last night she felt nauseaous, then vomiting BRB x 2, about a cup of blood per patient.  States first episode of emesis was bloody.  She did not vomit food product or bilious material.  No previous hx of GI bleeding.  Her last BM was yesterday and was brown in color, no melena or hematochezia.  She takes inhaled corticosteroids daily for asthma.  Otherwise, no copious NSAID use.  GI was consulted for further evaluation of UGIB.  Since admit, her VSS and no further episodes of heamtemesis.  Denies CP, SOB, weakness, fatigue, palpitations.       Review of Systems   Constitutional:  No fever, No chills, No weakness, No fatigue.    Eye:  No recent visual problem.    Ear/Nose/Mouth/Throat:  No decreased hearing.    Respiratory:  No shortness of breath, No cough, No wheezing.    Cardiovascular:  No chest pain.    Gastrointestinal:  see HPI..    Musculoskeletal:  No back pain.    Integumentary:  No rash, No  pruritus, No skin lesion.    Neurologic:  Alert and oriented X4, No confusion, No headache.    Psychiatric:  No anxiety, No depression.       Health Status   Allergies:    Allergic Reactions (All)  No Known Allergies,    Allergies (1) Active Reaction  No Known Allergies None Documented     Current medications:  (Selected)   Inpatient Medications  Ordered  IVF Normal Saline NS Bolus 1000cc: 1,000 mL, 1,000 mL, IV, 1000 mL/hr, start date 12/22/15 14:00:00 CST, stop date 12/22/15 14:00:00 CST  IVF Normal Saline NS Infusion 1,000 mL: 1,000 mL, 1,000 mL, IV, bolus, start date 10/18/17 1:18:00 CDT  Phenergan IV Push: 12.5 mg, form: Injection, IV Push, Once, first dose 12/07/15 16:53:00 CST, stop date 12/07/15 16:53:00 CST, STAT, 24  Protonix: 40 mg, form: Injection, IV Slow, Daily, first dose 10/18/17 3:21:00 CDT, 66,022  Sodium Chloride 0.9% 1,000 mL: 1,000 mL, 1,000 mL, IV, 125 mL/hr, start date 10/18/17 3:21:00 CDT  Zofran: 4 mg, form: Injection, IV Push, q4hr PRN for nausea, first dose 10/18/17 3:21:00 CDT, 26,051  acetaminophen: 1,000 mg, form: Tab, Oral, q6hr PRN for pain, mild, first dose 10/18/17 3:21:00 CDT, 30,022  Documented Medications  Documented  ALBUTEROL SUL 2.5 MG/3 ML SOLN: NEB, q6hr  AMITRIPTYLINE HCL 25 MG TAB: 25 mg = 1 tab(s), Oral, qPM  ATORVASTATIN 20 MG TABLET: 20 mg = 1 tab(s), Oral, Daily  DULERA 200 MCG/5 MCG INHALER: 2 puff(s), INH, BID  LORATADINE 10 MG TABLET: 10 mg = 1 tab(s), Oral, Daily  LORAZEPAM 0.5 MG TABLET: mg tab(s), Oral  PANTOPRAZOLE SOD DR 20 MG TAB: 20 mg = 1 tab(s), Oral, Daily  QUETIAPINE FUMARATE 50 MG TAB:   RANITIDINE 150 MG TABLET:   SERTRALINE  MG TABLET: mg tab(s), Oral  SPIRONOLACTONE 50 MG TABLET: 50 mg = 1 tab(s), Oral, Daily  ZOLPIDEM TARTRATE 10 MG TABLET: mg tab(s), Oral  omeprazole 40 mg oral DR capsule: 40 mg = 1 cap(s), Oral, Daily, # 30 cap(s), 0 Refill(s),    Home Medications (13) Active  ALBUTEROL SUL 2.5 MG/3 ML MINDA SMITH, q6hr  AMITRIPTYLINE HCL 25  MG TAB 25 mg = 1 tab(s), Oral, qPM  ATORVASTATIN 20 MG TABLET 20 mg = 1 tab(s), Oral, Daily  DULERA 200 MCG/5 MCG INHALER 2 puff(s), INH, BID  LORATADINE 10 MG TABLET 10 mg = 1 tab(s), Oral, Daily  LORAZEPAM 0.5 MG TABLET , Oral  omeprazole 40 mg oral DR capsule 40 mg = 1 cap(s), Oral, Daily  PANTOPRAZOLE SOD DR 20 MG TAB 20 mg = 1 tab(s), Oral, Daily  QUETIAPINE FUMARATE 50 MG TAB   RANITIDINE 150 MG TABLET   SERTRALINE  MG TABLET , Oral  SPIRONOLACTONE 50 MG TABLET 50 mg = 1 tab(s), Oral, Daily  ZOLPIDEM TARTRATE 10 MG TABLET , Oral        Histories   Past Medical History:    Resolved  Post-traumatic stress disorder (PTSD) (309.81):  Resolved.  Anxiety (300.00):  Resolved on 12/25/2015 at 19 years.  Bipolar affective disorder, manic (296.40):  Resolved.  Depression (311):  Resolved.  NIDDM (250.00):  Resolved.  Hypothyroid (244.9):  Resolved.  Hyperlipidemia (27954485):  Resolved.  History of gastric stapling (104720642):  Resolved on 12/25/2015 at 19 years.  Acute URI (70596482):  Resolved on 12/25/2015 at 19 years.  High risk pregnancy (08881128):  Resolved on 12/25/2015 at 19 years.  Rhinorrhea ([id number]):  Resolved on 12/25/2015 at 19 years.   Family History: uknown - adopted   Procedure history:    GASTRIC SLEEVE on 12/18/2014 at 18 Years.  Bariatric operative procedure (SNOMED CT 8288527630).  Cholecystectomy; (CPT4 29156).   Social History     smokes 1/2 ppd. denies etoh use..        Physical Examination   Vital Signs   10/18/2017 7:40 CDT      Temperature Oral          36.8 DegC                             Peripheral Pulse Rate     60 bpm                             SpO2                      97 %                             Systolic Blood Pressure   115 mmHg                             Diastolic Blood Pressure  72 mmHg                             Mean Arterial Pressure, Cuff              86 mmHg    10/18/2017 4:00 CDT      Oxygen Therapy            Room air     General:   Alert and oriented, No acute distress, attendant at bedside.    Eye:  Extraocular movements are intact.    HENT:  Normal hearing.    Respiratory:  Respirations are non-labored, on room air.    Cardiovascular:  Normal rate, Regular rhythm, No edema.    Gastrointestinal:  Soft, Non-distended, Normal bowel sounds, ttp epigastrium and ruq without rebound or guarding; increased adiposity.    Musculoskeletal:  Normal range of motion.    Integumentary:  Warm, Dry, Pink.    Neurologic:  Alert, Oriented.    Psychiatric:  Cooperative, Appropriate mood & affect.       Review / Management   Laboratory Results   Today's Lab Results : PowerNote Discrete Results   10/18/2017 2:20 CDT      UA Appear                 CLEAR                             UA Color                  YELLOW                             UA Spec Grav              1.023                             UA Bili                   Negative                             UA pH                     6.5                             UA Urobilinogen           1.0                             UA Blood                  Negative                             UA Glucose                Negative                             UA Ketones                Negative                             UA Protein                Negative                             UA Nitrite                Negative                             UA Leuk Est               Negative                             UA WBC                    NONE SEEN                             UA RBC                    6 /HPF  HI                             UA Bacteria               NONE SEEN /HPF                             UA Squam Epithelial       NONE SEEN    10/18/2017 1:21 CDT      WBC                       13.5 x10(3)/mcL  HI                             RBC                       5.24 x10(6)/mcL                             Hgb                       15.6 gm/dL                             Hct                       46.0 %                              Platelet                  283 x10(3)/mcL                             MCV                       87.8 fL                             MCH                       29.8 pg                             MCHC                      33.9 gm/dL                             RDW                       12.7 %                             MPV                       9.2 fL  LOW                             Abs Neut                  10.10 x10(3)/mcL  HI                             Neutro Auto               75 %  NA                             Lymph Auto                19 %                             Mono Auto                 5 %  NA                             Eos Auto                  0 %  NA                             Abs Eos                   0.1 x10(3)/mcL                             Basophil Auto             0 %  NA                             Abs Neutro                10.10 x10(3)/mcL  HI                             Abs Lymph                 2.5 x10(3)/mcL                             Abs Mono                  0.7 x10(3)/mcL                             Abs Baso                  0.0 x10(3)/mcL                             PT                        13.0 second(s)                             INR                       1.00                             PTT                       29.5 second(s)                             Sodium Lvl                141 mmol/L                             Potassium Lvl             3.5 mmol/L                             Chloride                  110 mmol/L  HI                             CO2                       23.0 mmol/L                             Calcium Lvl               8.5 mg/dL                             Glucose Lvl               113 mg/dL  HI                             BUN                       16.0 mg/dL                             Creatinine                0.63 mg/dL                             eGFR-AA                   >60 mL/min/1.73 m2  NA                             eGFR-LOUIS                   >60 mL/min/1.73 m2  NA                             Bili Total                0.2 mg/dL                             Bili Direct               0.10 mg/dL                             Bili Indirect             0.10 mg/dL                             AST                       11 unit/L  LOW                             ALT                       17 unit/L                             Alk Phos                  98 unit/L                             Total Protein             6.9 gm/dL                             Albumin Lvl               3.60 gm/dL                             Globulin                  3.30 gm/dL                             A/G Ratio                 1.1  NA                             Lipase Lvl                124 unit/L                             ABO/Rh                    O POS                             Antibody Screen           Negative           Impression and Plan   20 yo CF unknown to our group (known to Dr. Cody) with a pmhx of depression, NIDDM, hypothyroidism, bipolar disorder, PTSD, asthma, hx of gastric sleeve (2014 at [facility]), recent dx of PUD per endoscopy by Dr. Cody.  Here with hematemesis.    1. Hematemsis - in pt with hx of gastric sleeve, recent dx of PUD, and recurrent need for inhaled corticosteroids  Hgb stable 15.6 g/dL  - monitor h/h  - monitor for bleeding  - NPO for EGD today to evaluate, likely source of bleed is ulcers given above risk factors  - on iv ppi daily      Professional Services   Thank you for this consult and for allowing us to assist in the care of this patient.  Please call with any questions.

## 2022-04-30 NOTE — DISCHARGE SUMMARY
Patient:   Ivana Ibarra            MRN: [id number]            FIN: [id number]               Age:   21 years     Sex:  Female     :  1995   Associated Diagnoses:   None   Author:   Sara Washburn MD      Please see today's H&P for hospital stay physical exam discharge diagnosis.  Patient got EGD done that showed Rylie-Arce tear  GI has cleared the patient to go home she'll be going home with Carafate and Protonix    Status stable  Discharge to home next line medication see medicine reconciliation  Instructions  Return to ER if signs and symptoms get worse  Follow-up    #1 with PCP within one week      Time spent in discharge was 31 minutes

## 2022-04-30 NOTE — PROGRESS NOTES
Health Maintenance     Pending (in the next year)        OverDue           Cervical Cancer Screening due  05/07/16  and every 1  year(s)           Influenza Vaccine due  10/02/17  and every 1  year(s)        Due            Alcohol Misuse Screening due  01/18/18  and every 1  year(s)           Hypertension Management-Education due  01/18/18  and every 1  year(s)        Due In Future            Hypertension Management-Blood Pressure not due until  07/18/18  and every 6  month(s)           Depression Screening not due until  07/26/18  and every 1  year(s)           Smoking Cessation not due until  11/21/18  and every 1  year(s)     Satisfied (in the past 1 year)        Satisfied            Blood Pressure Screening on  01/18/18.  Satisfied by Tamra Neville RN.           Body Mass Index Check on  01/18/18.  Satisfied by Tamra Neville RN.           Depression Screening on  07/26/17.  Satisfied by Iman Wiggins RN           Hypertension Management-Blood Pressure on  01/18/18.  Satisfied by Tamra Neville RN.           Influenza Vaccine on  02/20/17.  Satisfied by Gilberto Bolaños MD           Obesity Screening on  01/18/18.  Satisfied by Tamra Neville RN.           Smoking Cessation on  11/21/17.  Satisfied by Carole Myles RN

## 2022-04-30 NOTE — ED PROVIDER NOTES
Patient:   Ivana Ibarra            MRN: [id number]            FIN: [id number]               Age:   21 years     Sex:  Female     :  1995   Associated Diagnoses:   Acute upper GI bleed   Author:   Marlene SAMPSON MD, Lars DIA      Basic Information   Time seen: Date & time 10/18/2017 01:12:00.   History source: Patient.   Arrival mode: Private vehicle.   History limitation: None.   Additional information: Patient's physician(s): Cici Smith, Chief Complaint from Nursing Triage Note : Chief Complaint   10/18/2017 0:57 CDT      Chief Complaint           pt states she had 2 episodes of vomitng tonight with blood in them. pt states she was DXd with stomach ulcers in the past 2 months. pt weak in triage and states she feels faint.  .      History of Present Illness   The patient presents with 20 y/o CF with history of stomach ulcers presents to ED following 2 episodes of vomiting with blood in them tonight. Pt was diagnosed via scope 2 months ago with GI ulcers. She complains of abdominal pain for one month and has been in/out of hospital since February for bronchitis and pneumonia. Pt states she is always on steroids. She denies dark stools, dysuria, changes in BM, or use of Motrin or ibuprofen. She denies any recent change in medications besides the addition of a fluid pill..  The onset was just prior to arrival.  Vomiting: blood.  Risk factors consist of none.  Prior episodes: none.  Therapy today: none.  Associated symptoms: abdominal pain, vomiting and denies dysuria.        Review of Systems   Constitutional symptoms:  No fever, no chills, no sweats, no weakness.    Skin symptoms:  No rash,    Eye symptoms:  No recent vision problems,    ENMT symptoms:  No ear pain, no nasal congestion.    Respiratory symptoms:  No shortness of breath, no orthopnea, no cough.    Cardiovascular symptoms:  No chest pain, no palpitations.    Gastrointestinal symptoms:  Abdominal pain, vomiting, blood  in vomit, no nausea, no diarrhea.    Genitourinary symptoms:  No dysuria, no hematuria.    Musculoskeletal symptoms:  No back pain, no Muscle pain.    Psychiatric symptoms:  No anxiety, no depression.    Allergy/immunologic symptoms:  No seasonal allergies, no food allergies.              Additional review of systems information: All other systems reviewed and otherwise negative.      Health Status   Medications:  (Selected)   Inpatient Medications  Ordered  IVF Normal Saline NS Bolus 1000cc: 1,000 mL, 1,000 mL, IV, 1000 mL/hr, start date 12/22/15 14:00:00 CST, stop date 12/22/15 14:00:00 CST  Phenergan IV Push: 12.5 mg, form: Injection, IV Push, Once, first dose 12/07/15 16:53:00 CST, stop date 12/07/15 16:53:00 CST, STAT, 24  Prescriptions  Prescribed  Ventolin HFA 90 mcg/inh inhalation aerosol: 2 puff(s), INH, q4hr, PRN PRN for wheezing, # 1 EA, 0 Refill(s)  Documented Medications  Documented  Vitamin D2 50,000 intl units oral capsule: See Instructions, 1 cap(s) Oral 1x/Wk, 0 Refill(s)  ZOLPIDEM TARTRATE 10 MG TABLET: mg tab(s), Oral.   Menstrual history: Last menstrual period: Date 9/25/2017.      Past Medical/ Family/ Social History   Medical history:    Resolved  Post-traumatic stress disorder (PTSD) (309.81):  Resolved.  Anxiety (300.00):  Resolved on 12/25/2015 at 19 years.  Bipolar affective disorder, manic (296.40):  Resolved.  Depression (311):  Resolved.  NIDDM (250.00):  Resolved.  Hypothyroid (244.9):  Resolved.  Hyperlipidemia (22252802):  Resolved.  History of gastric stapling (338188683):  Resolved on 12/25/2015 at 19 years.  Acute URI (95821632):  Resolved on 12/25/2015 at 19 years.  High risk pregnancy (91014024):  Resolved on 12/25/2015 at 19 years.  Rhinorrhea ([id number]):  Resolved on 12/25/2015 at 19 years..   Surgical history:    GASTRIC SLEEVE on 12/18/2014 at 18 Years.  Bariatric operative procedure ([id number]).  Cholecystectomy; (28574)..   Family history:     Diabetes mellitus type 2  Father  Hyperthyroidism.  Mother  Asthma.  Mother  Cardiac arrhythmia.  Mother  Hyperlipidemia.  Father  Mother  COPD (chronic obstructive pulmonary disease).  Father  .   Social history: Alcohol use: Denies, Tobacco use: Regularly.      Physical Examination               Vital Signs   Vital Signs   10/18/2017 1:09 CDT      Peripheral Pulse Rate     125 bpm  HI                             Heart Rate Monitored      125 bpm  HI                             Respiratory Rate          28 br/min  HI                             SpO2                      95 %                             Oxygen Therapy            Room air                             Systolic Blood Pressure   135 mmHg                             Diastolic Blood Pressure  95 mmHg  HI                             Mean Arterial Pressure, Cuff              108 mmHg    10/18/2017 0:57 CDT      Temperature Oral          37.1 DegC                             Peripheral Pulse Rate     134 bpm  HI                             Respiratory Rate          20 br/min                             SpO2                      93 %  LOW                             Oxygen Therapy            Room air                             Systolic Blood Pressure   126 mmHg                             Diastolic Blood Pressure  84 mmHg  .               Per nurse's notes.   Measurements   10/18/2017 0:57 CDT      Weight Estimated          100 kg                             Height/Length Estimated   157 cm                             Body Mass Index Estimated 40.57 kg/m2  .   Basic Oxygen Information   10/18/2017 1:09 CDT      SpO2                      95 %                             Oxygen Therapy            Room air    10/18/2017 0:57 CDT      SpO2                      93 %  LOW                             Oxygen Therapy            Room air  .   General:  Alert, no acute distress.    Skin:  Warm, pink, intact, moist, no rash.    Head:  Normocephalic, atraumatic.     Cardiovascular:  No murmur, Normal peripheral perfusion, No edema, Tachycardia.    Respiratory:  Lungs are clear to auscultation, respirations are non-labored, breath sounds are equal, Symmetrical chest wall expansion.    Gastrointestinal:  Soft, Nontender, Non distended, Normal bowel sounds, moderate epigastric pain.    Musculoskeletal:  Normal ROM, normal strength.    Neurological:  Alert and oriented to person, place, time, and situation, No focal neurological deficit observed, CN II-XII intact, normal sensory observed, normal motor observed, normal speech observed.    Lymphatics:  No lymphadenopathy.   Psychiatric:  Cooperative, appropriate mood & affect, normal judgment.       Medical Decision Making   Documents reviewed:  Emergency department nurses' notes.   Results review:  Lab results : Lab View   10/18/2017 1:21 CDT      Sodium Lvl                141 mmol/L                             Potassium Lvl             3.5 mmol/L                             Chloride                  110 mmol/L  HI                             CO2                       23.0 mmol/L                             Calcium Lvl               8.5 mg/dL                             Glucose Lvl               113 mg/dL  HI                             BUN                       16.0 mg/dL                             Creatinine                0.63 mg/dL                             eGFR-AA                   >60 mL/min/1.73 m2  NA                             eGFR-LOUIS                  >60 mL/min/1.73 m2  NA                             Bili Total                0.2 mg/dL                             Bili Direct               0.10 mg/dL                             Bili Indirect             0.10 mg/dL                             AST                       11 unit/L  LOW                             ALT                       17 unit/L                             Alk Phos                  98 unit/L                             Total Protein             6.9  gm/dL                             Albumin Lvl               3.60 gm/dL                             Globulin                  3.30 gm/dL                             A/G Ratio                 1.1  NA                             Lipase Lvl                124 unit/L                             PT                        13.0 second(s)                             INR                       1.00                             PTT                       29.5 second(s)                             WBC                       13.5 x10(3)/mcL  HI                             RBC                       5.24 x10(6)/mcL                             Hgb                       15.6 gm/dL                             Hct                       46.0 %                             Platelet                  283 x10(3)/mcL                             MCV                       87.8 fL                             MCH                       29.8 pg                             MCHC                      33.9 gm/dL                             RDW                       12.7 %                             MPV                       9.2 fL  LOW                             Abs Neut                  10.10 x10(3)/mcL  HI                             Neutro Auto               75 %  NA                             Lymph Auto                19 %                             Mono Auto                 5 %  NA                             Eos Auto                  0 %  NA                             Abs Eos                   0.1 x10(3)/mcL                             Basophil Auto             0 %  NA                             Abs Neutro                10.10 x10(3)/mcL  HI                             Abs Lymph                 2.5 x10(3)/mcL                             Abs Mono                  0.7 x10(3)/mcL                             Abs Baso                  0.0 x10(3)/mcL  .      Reexamination/ Reevaluation   Time: 10/18/2017 02:14:00 .   Vital signs   Basic Oxygen  Information   10/18/2017 1:09 CDT      SpO2                      95 %                             Oxygen Therapy            Room air    10/18/2017 0:57 CDT      SpO2                      93 %  LOW                             Oxygen Therapy            Room air     Assessment: Tachycardic after fluids now with normal heart rate normal CBC patient given IV proton pump inhibitor discussed case with Dr. Roth will keep nothing by mouth for possible EGD in the morning discussed case with Kim with hospitalist service will admit.      Impression and Plan   Diagnosis   Acute upper GI bleed ([id number] K92.2)   Plan   Condition: Improved, Stable.    Disposition: Admit time  10/18/2017 02:15:00, Admit to Inpatient Unit.    Counseled: Patient, Family, Regarding diagnosis, Regarding diagnostic results, Regarding treatment plan, Regarding prescription, Patient indicated understanding of instructions.    Notes: I, Vivian Almodovar, acted solely as a scribe for and in the presence of  who performed the service.,       This scribes note accurately reflects the work done by me I have reviewed the note and personally performed a history and physical and agree with all the documentation and findings.

## 2022-04-30 NOTE — H&P
Patient:   Ivana Ibarra            MRN: [id number]            FIN: [id number]               Age:   21 years     Sex:  Female     :  1995   Associated Diagnoses:   None   Author:   Zenon Lake MD      DATE OF ADMIT: 10/18/2017 2:54  REFERRAL SOURCE: Marlene WHITNEY  PCP: Cici Lara MD     CHIEF COMPLAINT: vomited blood     HISTORY OF PRESENTING ILLNESS  Patient is a 21-year-old female with a past medical history of depression, NIDDM, hypothyroidism, bipolar disorder, PTSD, and a recent diagnosis of peptic ulcer disease per endoscopy 2 months ago by Dr. Cody.  She presented to the ER tonight having 2 episodes of vomiting bright red blood.  This was associated with nausea, and epigastric abdominal pain.  She reports she did not throw up any food material or bilious vomitus, and that the 2 time she threw up were both just blood.  She denies any prior history of hematemesis.  She denies any significant recent NSAID use, and has not been on any steroids recently.  She does have severe asthma for which she uses several inhalers and likely an inhaled corticosteroid.  On arrival to the ER she was tachycardic in her H&H was within normal limits.  She has had no further episodes of hematemesis since arrival.  GI was consult and Dr. Roth plans to do endoscopy in the morning and she will be nothing by mouth until this time.    REVIEW OF SYSTEMS  Except as documented, all other systems reviewed and negative    PAST MEDICAL HISTORY  Post-traumatic stress disorder (PTSD)  Bipolar affective disorder, manic  Depression  NIDDM  Hypothyroid  Hyperlipidemia  PUD/GERD    PAST SURGICAL HISTORY  Bariatric operative procedure  Cholecystectomy    FAMILY HISTORY  Reviewed, noncontributory to current condition.    SOCIAL HISTORY  Smokes tobacco daily, but denies drug or alcohol    ALLERGIES  No Known Allergies    HOME MEDICATIONS  ALBUTEROL SUL 2.5 MG/3 ML SOLN , NEB, q6hr  AMITRIPTYLINE HCL 25 MG  TAB 25 mg = 1 tab(s), Oral, qPM  ATORVASTATIN 20 MG TABLET 20 mg = 1 tab(s), Oral, Daily  DULERA 200 MCG/5 MCG INHALER 2 puff(s), INH, BID  LORATADINE 10 MG TABLET 10 mg = 1 tab(s), Oral, Daily  LORAZEPAM 0.5 MG TABLET , Oral  omeprazole 40 mg oral DR capsule 40 mg = 1 cap(s), Oral, Daily  PANTOPRAZOLE SOD DR 20 MG TAB 20 mg = 1 tab(s), Oral, Daily  QUETIAPINE FUMARATE 50 MG TAB   RANITIDINE 150 MG TABLET   SERTRALINE  MG TABLET , Oral  SPIRONOLACTONE 50 MG TABLET 50 mg = 1 tab(s), Oral, Daily  ZOLPIDEM TARTRATE 10 MG TABLET , Oral    PHYSICAL EXAM  Temp Oral     37.1 DegC  (OCT 18 00:57)  Heart Rate Peripheral   95 bpm  (OCT 18 02:04)  Resp Rate         14 br/min  (OCT 18 02:04)  SBP      124 mmHg  (OCT 18 02:04)  DBP      L 58mmHg  (OCT 18 02:04)  SpO2   L 93%  (OCT 18 02:04)  GENERAL: Morbidly obese but awake alert and oriented no acute distress mentating appropriately  HEENT: Normal cephalic atraumatic CN 2-12 intact  NECK: supple  CHEST: ctab no WRR   CVS: RRR no significant edema   ABD: obese epigastric ttp no rebound or gaurding   EXTREMITIES: no c/c/e  NEURO: non-focal   SKIN: warm dry intact     LABS  Chemistry Hematology/Coagulation   Sodium Lvl: 141 mmol/L (10/18/17 02:01:14) PT: 13 second(s) (10/18/17 01:47:06)   Potassium Lvl: 3.5 mmol/L (10/18/17 02:01:14) INR: 1 (10/18/17 01:47:06)   Chloride: 110 mmol/L High (10/18/17 02:01:14) PTT: 29.5 second(s) (10/18/17 01:47:07)   CO2: 23 mmol/L (10/18/17 02:01:14) WBC: 13.5 x10(3)/mcL High (10/18/17 01:32:00)   Calcium Lvl: 8.5 mg/dL (10/18/17 02:01:14) RBC: 5.24 x10(6)/mcL (10/18/17 01:32:00)   Glucose Lvl: 113 mg/dL High (10/18/17 02:01:14) Hgb: 15.6 gm/dL (10/18/17 01:32:00)   BUN: 16 mg/dL (10/18/17 02:01:14) Hct: 46 % (10/18/17 01:32:00)   Creatinine: 0.63 mg/dL (10/18/17 02:01:14) Platelet: 283 x10(3)/mcL (10/18/17 01:32:00)   eGFR-AA: >60 (10/18/17 02:01:17) MCV: 87.8 fL (10/18/17 01:32:00)   eGFR-LOUIS: >60 (10/18/17 02:01:18) MCH: 29.8 pg  (10/18/17 01:32:00)   Bili Total: 0.2 mg/dL (10/18/17 02:01:14) MCHC: 33.9 gm/dL (10/18/17 01:32:00)   Bili Direct: 0.1 mg/dL (10/18/17 02:01:14) RDW: 12.7 % (10/18/17 01:32:00)   Bili Indirect: 0.1 mg/dL (10/18/17 02:01:14) MPV: 9.2 fL Low (10/18/17 01:32:00)   AST: 11 unit/L Low (10/18/17 02:01:14) Abs Neut: 10.1 x10(3)/mcL High (10/18/17 01:32:00)   ALT: 17 unit/L (10/18/17 02:01:14) Neutro Auto: 75 % (10/18/17 01:32:01)   Alk Phos: 98 unit/L (10/18/17 02:01:14) Lymph Auto: 19 % (10/18/17 01:32:01)   Total Protein: 6.9 gm/dL (10/18/17 02:01:14) Mono Auto: 5 % (10/18/17 01:32:01)   Albumin Lvl: 3.6 gm/dL (10/18/17 02:01:14) Eos Auto: 0 % (10/18/17 01:32:01)   Globulin: 3.3 gm/dL (10/18/17 02:01:14) Abs Eos: 0.1 x10(3)/mcL (10/18/17 01:32:01)   A/G Ratio: 1.1 (10/18/17 02:01:14) Basophil Auto: 0 % (10/18/17 01:32:01)   Lipase Lvl: 124 unit/L (10/18/17 02:01:16) Abs Neutro: 10.1 x10(3)/mcL High (10/18/17 01:32:01)    Abs Lymph: 2.5 x10(3)/mcL (10/18/17 01:32:01)    Abs Mono: 0.7 x10(3)/mcL (10/18/17 01:32:01)    Abs Baso: 0 x10(3)/mcL (10/18/17 01:32:01)   RADIOLOGY  none     ASSESSMENT  1.  Hematemesis ×2, with nausea and epigastric pain  2.  History of peptic ulcer disease diagnosed 2 months ago on endoscopy  3.  Severe persistent asthma with frequent steroid usage  4.  Depression, bipolar disorder PTSD all stable    PLAN  - Suspect she is having hematemesis secondary to her known esophageal and gastric ulcers  - We'll monitor H&H and transfuse as needed  - Nothing by mouth for planned endoscopy in the morning per Dr. Roth   - Resume home medications as appropriate  - IV protonix     ADVANCED DIRECTIVES: none fuill code  VTE PROPHYLAXIS: ambulate     Total time spent on admission: 71 minutes

## 2022-04-30 NOTE — PROGRESS NOTES
Patient:   Ivana Ibarra            MRN: [id number]            FIN: [id number]               Age:   22 years     Sex:  Female     :  1995   Associated Diagnoses:   None   Author:   Shayna Beard MD      Pre-Op Dx:  22 F P2 with elective sterilization        Plan:  LSC BS    She has completed childbearing and understands this is considered a permanent procedure.    Reviewed resident's H&P.  Agree with plan.  Proceed with case

## 2022-09-16 ENCOUNTER — HISTORICAL (OUTPATIENT)
Dept: ADMINISTRATIVE | Facility: HOSPITAL | Age: 27
End: 2022-09-16
Payer: MEDICAID

## 2022-12-30 ENCOUNTER — LAB VISIT (OUTPATIENT)
Dept: LAB | Facility: HOSPITAL | Age: 27
End: 2022-12-30
Attending: STUDENT IN AN ORGANIZED HEALTH CARE EDUCATION/TRAINING PROGRAM
Payer: MEDICAID

## 2022-12-30 DIAGNOSIS — H66.90 MALIGNANT OTITIS MEDIA, UNSPECIFIED LATERALITY: ICD-10-CM

## 2022-12-30 DIAGNOSIS — J30.1 ALLERGIC RHINITIS DUE TO POLLEN: ICD-10-CM

## 2022-12-30 DIAGNOSIS — J01.91 RECURRENT ACUTE SINUSITIS: ICD-10-CM

## 2022-12-30 DIAGNOSIS — J45.50 SEVERE PERSISTENT ASTHMA: Primary | ICD-10-CM

## 2022-12-30 DIAGNOSIS — Z87.01 PERSONAL HISTORY OF PNEUMONIA (RECURRENT): ICD-10-CM

## 2022-12-30 DIAGNOSIS — R53.83 FATIGUE, UNSPECIFIED TYPE: ICD-10-CM

## 2022-12-30 LAB
IGA SERPL-MCNC: 144 MG/DL (ref 65–421)
IGG SERPL-MCNC: 886 MG/DL (ref 522–1631)
IGM SERPL-MCNC: 75 MG/DL (ref 33–293)

## 2022-12-30 PROCEDURE — 82784 ASSAY IGA/IGD/IGG/IGM EACH: CPT

## 2022-12-30 PROCEDURE — 86317 IMMUNOASSAY INFECTIOUS AGENT: CPT

## 2022-12-30 PROCEDURE — 36415 COLL VENOUS BLD VENIPUNCTURE: CPT

## 2022-12-30 PROCEDURE — 82787 IGG 1 2 3 OR 4 EACH: CPT

## 2023-01-03 LAB — IGG4 SER-MCNC: 84 MG/DL (ref 2.4–121)

## 2023-01-05 LAB
S PN DA SERO 19F IGG SER-MCNC: 128.2 MCG/ML
S PNEUM DA 1 IGG SER-MCNC: 30.5 MCG/ML
S PNEUM DA 10A IGG SER-MCNC: 7.8 MCG/ML
S PNEUM DA 11A IGG SER-MCNC: 6.5 MCG/ML
S PNEUM DA 12F IGG SER-MCNC: 4.2 MCG/ML
S PNEUM DA 14 IGG SER-MCNC: 19.3 MCG/ML
S PNEUM DA 15B IGG SER-MCNC: 9.8 MCG/ML
S PNEUM DA 17F IGG SER-MCNC: 10.3 MCG/ML
S PNEUM DA 18C IGG SER-MCNC: 27.7 MCG/ML
S PNEUM DA 19A IGG SER-MCNC: 38.4 MCG/ML
S PNEUM DA 2 IGG SER-MCNC: 0.6 MCG/ML
S PNEUM DA 20A IGG SER-MCNC: 20 MCG/ML
S PNEUM DA 22F IGG SER-MCNC: 22.4 MCG/ML
S PNEUM DA 23F IGG SER-MCNC: 30.1 MCG/ML
S PNEUM DA 3 IGG SER-MCNC: 2.5 MCG/ML
S PNEUM DA 33F IGG SER-MCNC: 5.3 MCG/ML
S PNEUM DA 4 IGG SER-MCNC: 3.2 MCG/ML
S PNEUM DA 5 IGG SER-MCNC: 36.5 MCG/ML
S PNEUM DA 6B IGG SER-MCNC: 24 MCG/ML
S PNEUM DA 7F IGG SER-MCNC: 10.4 MCG/ML
S PNEUM DA 8 IGG SER-MCNC: 8.4 MCG/ML
S PNEUM DA 9N IGG SER-MCNC: NORMAL MCG/ML
S PNEUM DA 9V IGG SER-MCNC: 20.5 MCG/ML

## 2023-03-09 ENCOUNTER — HOSPITAL ENCOUNTER (OUTPATIENT)
Dept: RADIOLOGY | Facility: HOSPITAL | Age: 28
Discharge: HOME | End: 2023-03-09
Payer: MEDICAID

## 2023-03-09 DIAGNOSIS — M54.16 LUMBAR RADICULOPATHY: ICD-10-CM

## 2023-03-09 DIAGNOSIS — M54.16 LUMBAR RADICULOPATHY: Primary | ICD-10-CM

## 2023-03-09 PROCEDURE — 72110 X-RAY EXAM L-2 SPINE 4/>VWS: CPT | Mod: TC

## 2023-09-01 ENCOUNTER — HOSPITAL ENCOUNTER (EMERGENCY)
Facility: HOSPITAL | Age: 28
Discharge: HOME | End: 2023-09-01
Attending: EMERGENCY MEDICINE
Payer: MEDICAID

## 2023-09-01 VITALS
RESPIRATION RATE: 21 BRPM | WEIGHT: 220 LBS | DIASTOLIC BLOOD PRESSURE: 52 MMHG | TEMPERATURE: 98 F | BODY MASS INDEX: 38.98 KG/M2 | HEART RATE: 53 BPM | OXYGEN SATURATION: 100 % | SYSTOLIC BLOOD PRESSURE: 93 MMHG | HEIGHT: 63 IN

## 2023-09-01 DIAGNOSIS — R53.83 FATIGUE, UNSPECIFIED TYPE: Primary | ICD-10-CM

## 2023-09-01 DIAGNOSIS — E86.0 DEHYDRATION: ICD-10-CM

## 2023-09-01 DIAGNOSIS — R53.1 WEAKNESS: ICD-10-CM

## 2023-09-01 LAB
ALBUMIN SERPL-MCNC: 3.6 G/DL (ref 3.5–5)
ALBUMIN/GLOB SERPL: 1.2 RATIO (ref 1.1–2)
ALP SERPL-CCNC: 85 UNIT/L (ref 40–150)
ALT SERPL-CCNC: 16 UNIT/L (ref 0–55)
AMORPH URATE CRY URNS QL MICRO: ABNORMAL /HPF
AMPHET UR QL SCN: NEGATIVE
APPEARANCE UR: ABNORMAL
AST SERPL-CCNC: 14 UNIT/L (ref 5–34)
B-HCG SERPL QL: NEGATIVE
BACTERIA #/AREA URNS AUTO: ABNORMAL /HPF
BARBITURATE SCN PRESENT UR: NEGATIVE
BASOPHILS # BLD AUTO: 0.02 X10(3)/MCL
BASOPHILS NFR BLD AUTO: 0.2 %
BENZODIAZ UR QL SCN: POSITIVE
BILIRUB SERPL-MCNC: 0.7 MG/DL
BILIRUB UR QL STRIP.AUTO: NEGATIVE
BNP BLD-MCNC: 64.3 PG/ML
BUN SERPL-MCNC: 11.1 MG/DL (ref 7–18.7)
CALCIUM SERPL-MCNC: 8.9 MG/DL (ref 8.4–10.2)
CANNABINOIDS UR QL SCN: NEGATIVE
CHLORIDE SERPL-SCNC: 107 MMOL/L (ref 98–107)
CK SERPL-CCNC: 44 U/L (ref 29–168)
CO2 SERPL-SCNC: 23 MMOL/L (ref 22–29)
COCAINE UR QL SCN: NEGATIVE
COLOR UR: ABNORMAL
CREAT SERPL-MCNC: 0.67 MG/DL (ref 0.55–1.02)
EOSINOPHIL # BLD AUTO: 0.06 X10(3)/MCL (ref 0–0.9)
EOSINOPHIL NFR BLD AUTO: 0.5 %
ERYTHROCYTE [DISTWIDTH] IN BLOOD BY AUTOMATED COUNT: 13.4 % (ref 11.5–17)
FENTANYL UR QL SCN: NEGATIVE
FLUAV AG UPPER RESP QL IA.RAPID: NOT DETECTED
FLUBV AG UPPER RESP QL IA.RAPID: NOT DETECTED
GFR SERPLBLD CREATININE-BSD FMLA CKD-EPI: >60 MLS/MIN/1.73/M2
GLOBULIN SER-MCNC: 3 GM/DL (ref 2.4–3.5)
GLUCOSE SERPL-MCNC: 114 MG/DL (ref 74–100)
GLUCOSE UR QL STRIP.AUTO: NEGATIVE
HCT VFR BLD AUTO: 45.4 % (ref 37–47)
HGB BLD-MCNC: 15.5 G/DL (ref 12–16)
IMM GRANULOCYTES # BLD AUTO: 0.06 X10(3)/MCL (ref 0–0.04)
IMM GRANULOCYTES NFR BLD AUTO: 0.5 %
KETONES UR QL STRIP.AUTO: NEGATIVE
LACTATE SERPL-SCNC: 0.7 MMOL/L (ref 0.5–2.2)
LEUKOCYTE ESTERASE UR QL STRIP.AUTO: NEGATIVE
LYMPHOCYTES # BLD AUTO: 2.67 X10(3)/MCL (ref 0.6–4.6)
LYMPHOCYTES NFR BLD AUTO: 20.4 %
MCH RBC QN AUTO: 30.2 PG (ref 27–31)
MCHC RBC AUTO-ENTMCNC: 34.1 G/DL (ref 33–36)
MCV RBC AUTO: 88.3 FL (ref 80–94)
MDMA UR QL SCN: NEGATIVE
MONOCYTES # BLD AUTO: 0.93 X10(3)/MCL (ref 0.1–1.3)
MONOCYTES NFR BLD AUTO: 7.1 %
NEUTROPHILS # BLD AUTO: 9.35 X10(3)/MCL (ref 2.1–9.2)
NEUTROPHILS NFR BLD AUTO: 71.3 %
NITRITE UR QL STRIP.AUTO: NEGATIVE
NRBC BLD AUTO-RTO: 0 %
OPIATES UR QL SCN: NEGATIVE
PCP UR QL: NEGATIVE
PH UR STRIP.AUTO: 6.5 [PH]
PH UR: 6.5 [PH] (ref 3–11)
PLATELET # BLD AUTO: 319 X10(3)/MCL (ref 130–400)
PMV BLD AUTO: 9.2 FL (ref 7.4–10.4)
POTASSIUM SERPL-SCNC: 4.1 MMOL/L (ref 3.5–5.1)
PROT SERPL-MCNC: 6.6 GM/DL (ref 6.4–8.3)
PROT UR QL STRIP.AUTO: ABNORMAL
RBC # BLD AUTO: 5.14 X10(6)/MCL (ref 4.2–5.4)
RBC #/AREA URNS AUTO: ABNORMAL /HPF
RBC UR QL AUTO: NEGATIVE
RSV A 5' UTR RNA NPH QL NAA+PROBE: NOT DETECTED
SARS-COV-2 RNA RESP QL NAA+PROBE: NOT DETECTED
SODIUM SERPL-SCNC: 138 MMOL/L (ref 136–145)
SP GR UR STRIP.AUTO: 1.02 (ref 1–1.03)
SQUAMOUS #/AREA URNS AUTO: ABNORMAL /HPF
STREP A PCR (OHS): NOT DETECTED
TROPONIN I SERPL-MCNC: <0.01 NG/ML (ref 0–0.04)
TSH SERPL-ACNC: 3.46 UIU/ML (ref 0.35–4.94)
UROBILINOGEN UR STRIP-ACNC: 2
WBC # SPEC AUTO: 13.09 X10(3)/MCL (ref 4.5–11.5)
WBC #/AREA URNS AUTO: ABNORMAL /HPF

## 2023-09-01 PROCEDURE — 93010 ELECTROCARDIOGRAM REPORT: CPT | Mod: ,,, | Performed by: INTERNAL MEDICINE

## 2023-09-01 PROCEDURE — 87651 STREP A DNA AMP PROBE: CPT | Performed by: EMERGENCY MEDICINE

## 2023-09-01 PROCEDURE — 85025 COMPLETE CBC W/AUTO DIFF WBC: CPT | Performed by: EMERGENCY MEDICINE

## 2023-09-01 PROCEDURE — 93010 EKG 12-LEAD: ICD-10-PCS | Mod: ,,, | Performed by: INTERNAL MEDICINE

## 2023-09-01 PROCEDURE — 96361 HYDRATE IV INFUSION ADD-ON: CPT

## 2023-09-01 PROCEDURE — 81025 URINE PREGNANCY TEST: CPT | Performed by: EMERGENCY MEDICINE

## 2023-09-01 PROCEDURE — 25000003 PHARM REV CODE 250: Performed by: EMERGENCY MEDICINE

## 2023-09-01 PROCEDURE — 99285 EMERGENCY DEPT VISIT HI MDM: CPT | Mod: 25

## 2023-09-01 PROCEDURE — 81001 URINALYSIS AUTO W/SCOPE: CPT | Mod: 59 | Performed by: EMERGENCY MEDICINE

## 2023-09-01 PROCEDURE — 83605 ASSAY OF LACTIC ACID: CPT | Performed by: EMERGENCY MEDICINE

## 2023-09-01 PROCEDURE — 96360 HYDRATION IV INFUSION INIT: CPT

## 2023-09-01 PROCEDURE — 80053 COMPREHEN METABOLIC PANEL: CPT | Performed by: EMERGENCY MEDICINE

## 2023-09-01 PROCEDURE — 83880 ASSAY OF NATRIURETIC PEPTIDE: CPT | Performed by: EMERGENCY MEDICINE

## 2023-09-01 PROCEDURE — 0241U COVID/RSV/FLU A&B PCR: CPT | Performed by: EMERGENCY MEDICINE

## 2023-09-01 PROCEDURE — 80307 DRUG TEST PRSMV CHEM ANLYZR: CPT | Performed by: EMERGENCY MEDICINE

## 2023-09-01 PROCEDURE — 84484 ASSAY OF TROPONIN QUANT: CPT | Performed by: EMERGENCY MEDICINE

## 2023-09-01 PROCEDURE — 84443 ASSAY THYROID STIM HORMONE: CPT | Performed by: EMERGENCY MEDICINE

## 2023-09-01 PROCEDURE — 82550 ASSAY OF CK (CPK): CPT | Performed by: EMERGENCY MEDICINE

## 2023-09-01 PROCEDURE — 93005 ELECTROCARDIOGRAM TRACING: CPT

## 2023-09-01 RX ORDER — SODIUM CHLORIDE 9 MG/ML
1000 INJECTION, SOLUTION INTRAVENOUS
Status: COMPLETED | OUTPATIENT
Start: 2023-09-01 | End: 2023-09-01

## 2023-09-01 RX ORDER — CLONAZEPAM 0.5 MG/1
0.5 TABLET ORAL 3 TIMES DAILY
COMMUNITY
Start: 2023-08-26

## 2023-09-01 RX ORDER — TRAZODONE HYDROCHLORIDE 100 MG/1
TABLET ORAL
COMMUNITY

## 2023-09-01 RX ADMIN — SODIUM CHLORIDE 1000 ML: 9 INJECTION, SOLUTION INTRAVENOUS at 01:09

## 2023-09-01 RX ADMIN — SODIUM CHLORIDE 1000 ML: 9 INJECTION, SOLUTION INTRAVENOUS at 12:09

## 2023-09-01 NOTE — ED PROVIDER NOTES
Encounter Date: 9/1/2023       History     Chief Complaint   Patient presents with    Fatigue     Pt complaint of concern for possible dehydration relating covid/rsv/ strept exposure as pt states that she feel weak and passed out last night     Patient is a 28 yo F presenting with fatigue and weakness. Started last night. Last week she and the rest of her family did have nausea, vomiting, and diarrhea. No pain currently. She has felt lightheaded without any vertigo like symptoms. No vomiting or diarrhea today. No rashes, urinary symptoms, cough, chest pain. Right now just feels very tired. She reports she had 3 syncopal episodes that were brief. No reported seizure like shaking, tongue biting, or urinary incontinence.         Review of patient's allergies indicates:  No Known Allergies  Past Medical History:   Diagnosis Date    Anxiety     Arthritis     Bulging lumbar disc     Depression     Fatty liver disease, nonalcoholic     GERD (gastroesophageal reflux disease)     Hyperlipidemia     Hypertension     Hypothyroidism     Kidney stones     Migraine headache     TOPHER on CPAP     PCOS (polycystic ovarian syndrome)      Past Surgical History:   Procedure Laterality Date    CHOLECYSTECTOMY      GASTRECTOMY      RENAL ARTERY STENT       Family History   Problem Relation Age of Onset    Hyperlipidemia Mother     Obesity Father     Diabetes Father     Hyperlipidemia Father     Sleep apnea Father     Cancer Maternal Grandfather     Cancer Paternal Grandfather     Heart disease Paternal Grandfather     Diabetes Paternal Grandfather     Obesity Paternal Grandfather      Social History     Tobacco Use    Smoking status: Never    Smokeless tobacco: Never   Substance Use Topics    Alcohol use: No    Drug use: No     Review of Systems   Constitutional:  Positive for fatigue. Negative for fever.   HENT:  Negative for sore throat.    Respiratory:  Negative for shortness of breath.    Cardiovascular:  Negative for chest pain.    Gastrointestinal:  Negative for nausea.   Genitourinary:  Negative for dysuria.   Musculoskeletal:  Negative for back pain.   Skin:  Negative for rash.   Neurological:  Positive for syncope, weakness and light-headedness. Negative for dizziness.   Hematological:  Does not bruise/bleed easily.       Physical Exam     Initial Vitals [09/01/23 1050]   BP Pulse Resp Temp SpO2   90/66 62 18 98.4 °F (36.9 °C) 100 %      MAP       --         Physical Exam    Nursing note and vitals reviewed.  Constitutional: She appears well-developed and well-nourished. No distress.   HENT:   Head: Normocephalic and atraumatic.   Mouth/Throat: Oropharynx is clear and moist.   Mild erythema of the Bilateral EACs   Eyes: Conjunctivae are normal. Pupils are equal, round, and reactive to light.   Neck: Neck supple.   Cardiovascular:  Normal rate, regular rhythm and normal heart sounds.           No murmur heard.  Pulmonary/Chest: Breath sounds normal. No respiratory distress. She has no wheezes. She has no rhonchi.   Abdominal: Abdomen is soft. Bowel sounds are normal. She exhibits no distension. There is no abdominal tenderness. There is no rebound and no guarding.   Musculoskeletal:         General: No edema. Normal range of motion.      Cervical back: Neck supple.     Neurological: She is alert and oriented to person, place, and time.   Skin: Skin is warm and dry.   Psychiatric: She has a normal mood and affect. Thought content normal.         ED Course   ED US Echo    Date/Time: 9/1/2023 3:26 PM    Performed by: Tracy Flanagan MD  Authorized by: Tracy Flanagan MD    Indication:  Syncope  Identified Structures:     The pericardial sac, myocardium, and 4 chambers were identified with the following echocardiographic windows:  Parasternal long axis, Parasternal short axis and Apical 4-chamber  Findings:     Pericardial Effusion:  Absent    Left Ventricle Ejection Fraction:  Normal (>55%)  IVC:     Collapsibility:  < 50%    Gross  [place] (RV:LV):  Normal    Impression:  Normal limited cardiac ultrasound    Labs Reviewed   URINALYSIS, REFLEX TO URINE CULTURE - Abnormal; Notable for the following components:       Result Value    Appearance, UA Cloudy (*)     Protein, UA Trace (*)     Urobilinogen, UA 2.0 (*)     All other components within normal limits   URINALYSIS, MICROSCOPIC - Abnormal; Notable for the following components:    Amporphous Urate Crystals, UA Many (*)     Squamous Epithelial Cells, UA Moderate (*)     All other components within normal limits   COMPREHENSIVE METABOLIC PANEL - Abnormal; Notable for the following components:    Glucose Level 114 (*)     All other components within normal limits   CBC WITH DIFFERENTIAL - Abnormal; Notable for the following components:    WBC 13.09 (*)     Neut # 9.35 (*)     IG# 0.06 (*)     All other components within normal limits   DRUG SCREEN, URINE (BEAKER) - Abnormal; Notable for the following components:    Benzodiazepine, Urine Positive (*)     All other components within normal limits    Narrative:     Cut off concentrations:    Amphetamines - 1000 ng/ml  Barbiturates - 200 ng/ml  Benzodiazepine - 200 ng/ml  Cannabinoids (THC) - 50 ng/ml  Cocaine - 300 ng/ml  Fentanyl - 1.0 ng/ml  MDMA - 500 ng/ml  Opiates - 300 ng/ml   Phencyclidine (PCP) - 25 ng/ml    Specimen submitted for drug analysis and tested for pH and specific gravity in order to evaluate sample integrity. Suspect tampering if specific gravity is <1.003 and/or pH is not within the range of 4.5 - 8.0  False negatives may result form substances such as bleach added to urine.  False positives may result for the presence of a substance with similar chemical structure to the drug or its metabolite.    This test provides only a PRELIMINARY analytical test result. A more specific alternate chemical method must be used in order to obtain a confirmed analytical result. Gas chromatography/mass spectrometry (GC/MS) is the preferred  confirmatory method. Other chemical confirmation methods are available. Clinical consideration and professional judgement should be applied to any drug of abuse test result, particularly when preliminary positive results are used.    Positive results will be confirmed only at the physicians request. Unconfirmed screening results are to be used only for medical purposes (treatment).        PREGNANCY TEST, URINE RAPID - Normal   COVID/RSV/FLU A&B PCR - Normal    Narrative:     The Xpert Xpress SARS-CoV-2/FLU/RSV plus is a rapid, multiplexed real-time PCR test intended for the simultaneous qualitative detection and differentiation of SARS-CoV-2, Influenza A, Influenza B, and respiratory syncytial virus (RSV) viral RNA in either nasopharyngeal swab or nasal swab specimens.         TROPONIN I - Normal   B-TYPE NATRIURETIC PEPTIDE - Normal   LACTIC ACID, PLASMA - Normal   TSH - Normal   STREP GROUP A BY PCR - Normal    Narrative:     The Xpert Xpress Strep A test is a rapid, qualitative in vitro diagnostic test for the detection of Streptococcus pyogenes (Group A ß-hemolytic Streptococcus, Strep A) in throat swab specimens from patients with signs and symptoms of pharyngitis.     CK - Normal   CBC W/ AUTO DIFFERENTIAL    Narrative:     The following orders were created for panel order CBC auto differential.  Procedure                               Abnormality         Status                     ---------                               -----------         ------                     CBC with Differential[[id number]]        Abnormal            Final result                 Please view results for these tests on the individual orders.        ECG Results              EKG 12-lead (Final result)  Result time 09/06/23 00:26:23      Final result by Interface, Lab In [facility] (09/06/23 00:26:23)                   Narrative:    Test Reason : R53.1,    Vent. Rate : 046 BPM     Atrial Rate : 046 BPM     P-R Int : 126 ms          QRS Dur :  076 ms      QT Int : 440 ms       P-R-T Axes : 030 021 039 degrees     QTc Int : 385 ms    Sinus bradycardia  Otherwise normal ECG  When compared with ECG of 29-JAN-2016 12:27,  Vent. rate has decreased BY  27 BPM  Confirmed by David Boggs MD ([id number]) on 9/6/2023 12:26:16 AM    Referred By: DOMINIC   SELF           Confirmed By:David Boggs MD                                  Imaging Results              X-Ray Chest AP Portable (Final result)  Result time 09/01/23 12:23:06      Final result by Rafal López MD (09/01/23 12:23:06)                   Impression:      No acute disease is seen      Electronically signed by: Rafal López MD  Date:    09/01/2023  Time:    12:23               Narrative:    EXAMINATION:  XR CHEST AP PORTABLE    CLINICAL HISTORY:  Chest Pain;    TECHNIQUE:  Single frontal view of the chest was performed.    COMPARISON:  05/15/2021    FINDINGS:  Lungs are clear.  Heart size is within normal limits.  The costophrenic angles are clear.                                       Medications   0.9%  NaCl infusion (0 mLs Intravenous Stopped 9/1/23 1305)   0.9%  NaCl infusion (0 mLs Intravenous Stopped 9/1/23 1400)     Medical Decision Making  Amount and/or Complexity of Data Reviewed  Labs: ordered.  Radiology: ordered.    Risk  Prescription drug management.                      ED Course as of 09/01/23 1532   Fri Sep 01, 2023   1416 MD to re-evaluate [GM]   1525 Patient feels better. She states she has to go get her kids at 4pm and doesn't wish to stay longer for evaluation. [GM]   1527 Patient reports she is not on any BP medictation or anything that could slow heart rate [GM]   1531 BP dropped slightly with standing. However, patient does not wish to stay though I recommended staying for further evaluation. . Results were reviewed with patient. Questions were answered along with a discussion of signs and symptoms to return for. Patient comfortable with plan of discharge.   [GM]      ED Course User  Index  [GM] Tracy Flanagan MD             Clinical Impression:   Final diagnoses:  [R53.1] Weakness  [R53.83] Fatigue, unspecified type (Primary)  [E86.0] Dehydration        ED Disposition Condition    Discharge Stable          ED Prescriptions    None       Follow-up Information       Follow up With Specialties Details Why Contact Info    Cici Hardy PA-C Internal Medicine In 1 day If symptoms worsen, return to the ED [address] [place] [id number] [phone]               Tracy Flanagan MD  09/08/23 0431

## 2023-09-01 NOTE — ED TRIAGE NOTES
Pt complaint of concern for possible dehydration relating covid/rsv/ strept exposure as pt states that she feel weak and passed out last night

## 2024-10-21 ENCOUNTER — HOSPITAL ENCOUNTER (EMERGENCY)
Facility: HOSPITAL | Age: 29
Discharge: HOME | End: 2024-10-21
Attending: EMERGENCY MEDICINE
Payer: MEDICAID

## 2024-10-21 VITALS
BODY MASS INDEX: 35.97 KG/M2 | HEIGHT: 63 IN | TEMPERATURE: 98 F | HEART RATE: 116 BPM | OXYGEN SATURATION: 97 % | WEIGHT: 203 LBS | SYSTOLIC BLOOD PRESSURE: 134 MMHG | DIASTOLIC BLOOD PRESSURE: 98 MMHG | RESPIRATION RATE: 18 BRPM

## 2024-10-21 DIAGNOSIS — T65.91XA INGESTION OF NONTOXIC SUBSTANCE, ACCIDENTAL OR UNINTENTIONAL, INITIAL ENCOUNTER: ICD-10-CM

## 2024-10-21 DIAGNOSIS — Z00.8 MEDICAL CLEARANCE FOR PSYCHIATRIC ADMISSION: Primary | ICD-10-CM

## 2024-10-21 LAB
ALBUMIN SERPL-MCNC: 3.9 G/DL (ref 3.5–5)
ALBUMIN/GLOB SERPL: 1.2 RATIO (ref 1.1–2)
ALP SERPL-CCNC: 84 UNIT/L (ref 40–150)
ALT SERPL-CCNC: 10 UNIT/L (ref 0–55)
AMPHET UR QL SCN: NEGATIVE
ANION GAP SERPL CALC-SCNC: 9 MEQ/L
APAP SERPL-MCNC: <10 UG/ML (ref 10–30)
AST SERPL-CCNC: 11 UNIT/L (ref 5–34)
BARBITURATE SCN PRESENT UR: NEGATIVE
BASOPHILS # BLD AUTO: 0.02 X10(3)/MCL
BASOPHILS NFR BLD AUTO: 0.2 %
BENZODIAZ UR QL SCN: NEGATIVE
BILIRUB SERPL-MCNC: 0.3 MG/DL
BILIRUB UR QL STRIP.AUTO: NEGATIVE
BUN SERPL-MCNC: 7.2 MG/DL (ref 7–18.7)
CALCIUM SERPL-MCNC: 9 MG/DL (ref 8.4–10.2)
CANNABINOIDS UR QL SCN: POSITIVE
CHLORIDE SERPL-SCNC: 107 MMOL/L (ref 98–107)
CLARITY UR: ABNORMAL
CO2 SERPL-SCNC: 25 MMOL/L (ref 22–29)
COCAINE UR QL SCN: NEGATIVE
COLOR UR AUTO: ABNORMAL
CREAT SERPL-MCNC: 0.74 MG/DL (ref 0.55–1.02)
CREAT/UREA NIT SERPL: 10
EOSINOPHIL # BLD AUTO: 0.01 X10(3)/MCL (ref 0–0.9)
EOSINOPHIL NFR BLD AUTO: 0.1 %
ERYTHROCYTE [DISTWIDTH] IN BLOOD BY AUTOMATED COUNT: 13 % (ref 11.5–17)
ETHANOL SERPL-MCNC: <10 MG/DL
FENTANYL UR QL SCN: NEGATIVE
GFR SERPLBLD CREATININE-BSD FMLA CKD-EPI: >60 ML/MIN/1.73/M2
GLOBULIN SER-MCNC: 3.2 GM/DL (ref 2.4–3.5)
GLUCOSE SERPL-MCNC: 99 MG/DL (ref 74–100)
GLUCOSE UR QL STRIP: NEGATIVE
HCT VFR BLD AUTO: 46.8 % (ref 37–47)
HGB BLD-MCNC: 15.6 G/DL (ref 12–16)
HGB UR QL STRIP: NEGATIVE
IMM GRANULOCYTES # BLD AUTO: 0.05 X10(3)/MCL (ref 0–0.04)
IMM GRANULOCYTES NFR BLD AUTO: 0.4 %
KETONES UR QL STRIP: NEGATIVE
LEUKOCYTE ESTERASE UR QL STRIP: NEGATIVE
LYMPHOCYTES # BLD AUTO: 2.14 X10(3)/MCL (ref 0.6–4.6)
LYMPHOCYTES NFR BLD AUTO: 18.3 %
MCH RBC QN AUTO: 30.4 PG (ref 27–31)
MCHC RBC AUTO-ENTMCNC: 33.3 G/DL (ref 33–36)
MCV RBC AUTO: 91.1 FL (ref 80–94)
MDMA UR QL SCN: NEGATIVE
MONOCYTES # BLD AUTO: 0.65 X10(3)/MCL (ref 0.1–1.3)
MONOCYTES NFR BLD AUTO: 5.6 %
NEUTROPHILS # BLD AUTO: 8.81 X10(3)/MCL (ref 2.1–9.2)
NEUTROPHILS NFR BLD AUTO: 75.4 %
NITRITE UR QL STRIP: NEGATIVE
NRBC BLD AUTO-RTO: 0 %
OHS QRS DURATION: 76 MS
OHS QTC CALCULATION: 444 MS
OPIATES UR QL SCN: NEGATIVE
PCP UR QL: NEGATIVE
PH UR STRIP: 7 [PH]
PH UR: 7 [PH] (ref 3–11)
PLATELET # BLD AUTO: 333 X10(3)/MCL (ref 130–400)
PMV BLD AUTO: 9.3 FL (ref 7.4–10.4)
POTASSIUM SERPL-SCNC: 3.9 MMOL/L (ref 3.5–5.1)
PROT SERPL-MCNC: 7.1 GM/DL (ref 6.4–8.3)
PROT UR QL STRIP: NEGATIVE
RBC # BLD AUTO: 5.14 X10(6)/MCL (ref 4.2–5.4)
SODIUM SERPL-SCNC: 141 MMOL/L (ref 136–145)
SP GR UR STRIP.AUTO: 1.01 (ref 1–1.03)
SPECIFIC GRAVITY, URINE AUTO (.000) (OHS): 1.01 (ref 1–1.03)
TSH SERPL-ACNC: 3.16 UIU/ML (ref 0.35–4.94)
UROBILINOGEN UR STRIP-ACNC: 0.2
WBC # BLD AUTO: 11.68 X10(3)/MCL (ref 4.5–11.5)

## 2024-10-21 PROCEDURE — 85025 COMPLETE CBC W/AUTO DIFF WBC: CPT | Performed by: EMERGENCY MEDICINE

## 2024-10-21 PROCEDURE — 80307 DRUG TEST PRSMV CHEM ANLYZR: CPT | Performed by: EMERGENCY MEDICINE

## 2024-10-21 PROCEDURE — 80143 DRUG ASSAY ACETAMINOPHEN: CPT | Performed by: EMERGENCY MEDICINE

## 2024-10-21 PROCEDURE — 84443 ASSAY THYROID STIM HORMONE: CPT | Performed by: EMERGENCY MEDICINE

## 2024-10-21 PROCEDURE — 81003 URINALYSIS AUTO W/O SCOPE: CPT | Mod: 59 | Performed by: EMERGENCY MEDICINE

## 2024-10-21 PROCEDURE — 82077 ASSAY SPEC XCP UR&BREATH IA: CPT | Performed by: EMERGENCY MEDICINE

## 2024-10-21 PROCEDURE — 80053 COMPREHEN METABOLIC PANEL: CPT | Performed by: EMERGENCY MEDICINE

## 2024-10-21 PROCEDURE — 99285 EMERGENCY DEPT VISIT HI MDM: CPT

## 2025-01-11 ENCOUNTER — HOSPITAL ENCOUNTER (EMERGENCY)
Facility: HOSPITAL | Age: 30
Discharge: HOME | End: 2025-01-11
Attending: EMERGENCY MEDICINE
Payer: COMMERCIAL

## 2025-01-11 VITALS
RESPIRATION RATE: 20 BRPM | BODY MASS INDEX: 35.08 KG/M2 | HEIGHT: 63 IN | OXYGEN SATURATION: 95 % | WEIGHT: 198 LBS | TEMPERATURE: 98 F | HEART RATE: 101 BPM | SYSTOLIC BLOOD PRESSURE: 134 MMHG | DIASTOLIC BLOOD PRESSURE: 91 MMHG

## 2025-01-11 DIAGNOSIS — T14.90XA BLUNT TRAUMA: ICD-10-CM

## 2025-01-11 DIAGNOSIS — S93.401A SPRAIN OF RIGHT ANKLE, UNSPECIFIED LIGAMENT, INITIAL ENCOUNTER: ICD-10-CM
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
## 2025-01-11 DIAGNOSIS — T07.XXXA MULTIPLE CONTUSIONS: ICD-10-CM

## 2025-01-11 LAB
ALBUMIN SERPL-MCNC: 3.9 G/DL (ref 3.5–5)
ALBUMIN/GLOB SERPL: 1.4 RATIO (ref 1.1–2)
ALP SERPL-CCNC: 95 UNIT/L (ref 40–150)
ALT SERPL-CCNC: 14 UNIT/L (ref 0–55)
AMPHET UR QL SCN: NEGATIVE
ANION GAP SERPL CALC-SCNC: 8 MEQ/L
APTT PPP: 25.2 SECONDS (ref 23.2–33.7)
AST SERPL-CCNC: 19 UNIT/L (ref 5–34)
B-HCG SERPL QL: NEGATIVE
BACTERIA #/AREA URNS AUTO: ABNORMAL /HPF
BARBITURATE SCN PRESENT UR: NEGATIVE
BASOPHILS # BLD AUTO: 0.03 X10(3)/MCL
BASOPHILS NFR BLD AUTO: 0.3 %
BENZODIAZ UR QL SCN: POSITIVE
BILIRUB SERPL-MCNC: 0.3 MG/DL
BILIRUB UR QL STRIP.AUTO: NEGATIVE
BUN SERPL-MCNC: 6.6 MG/DL (ref 7–18.7)
CALCIUM SERPL-MCNC: 9 MG/DL (ref 8.4–10.2)
CANNABINOIDS UR QL SCN: NEGATIVE
CHLORIDE SERPL-SCNC: 106 MMOL/L (ref 98–107)
CK SERPL-CCNC: 39 U/L (ref 29–168)
CLARITY UR: CLEAR
CO2 SERPL-SCNC: 21 MMOL/L (ref 22–29)
COCAINE UR QL SCN: NEGATIVE
COLOR UR AUTO: ABNORMAL
CREAT SERPL-MCNC: 0.67 MG/DL (ref 0.55–1.02)
CREAT/UREA NIT SERPL: 10
EOSINOPHIL # BLD AUTO: 0.01 X10(3)/MCL (ref 0–0.9)
EOSINOPHIL NFR BLD AUTO: 0.1 %
ERYTHROCYTE [DISTWIDTH] IN BLOOD BY AUTOMATED COUNT: 13.2 % (ref 11.5–17)
ETHANOL SERPL-MCNC: <10 MG/DL
FENTANYL UR QL SCN: POSITIVE
GFR SERPLBLD CREATININE-BSD FMLA CKD-EPI: >60 ML/MIN/1.73/M2
GLOBULIN SER-MCNC: 2.8 GM/DL (ref 2.4–3.5)
GLUCOSE SERPL-MCNC: 99 MG/DL (ref 74–100)
GLUCOSE UR QL STRIP: NORMAL
GROUP & RH: NORMAL
HCT VFR BLD AUTO: 47.7 % (ref 37–47)
HGB BLD-MCNC: 16 G/DL (ref 12–16)
HGB UR QL STRIP: NEGATIVE
IMM GRANULOCYTES # BLD AUTO: 0.12 X10(3)/MCL (ref 0–0.04)
IMM GRANULOCYTES NFR BLD AUTO: 1.2 %
INDIRECT COOMBS: NORMAL
INR PPP: 0.9
KETONES UR QL STRIP: NEGATIVE
LACTATE SERPL-SCNC: 2.1 MMOL/L (ref 0.5–2.2)
LEUKOCYTE ESTERASE UR QL STRIP: NEGATIVE
LYMPHOCYTES # BLD AUTO: 2.56 X10(3)/MCL (ref 0.6–4.6)
LYMPHOCYTES NFR BLD AUTO: 25.4 %
MCH RBC QN AUTO: 30.1 PG (ref 27–31)
MCHC RBC AUTO-ENTMCNC: 33.5 G/DL (ref 33–36)
MCV RBC AUTO: 89.7 FL (ref 80–94)
MDMA UR QL SCN: NEGATIVE
MONOCYTES # BLD AUTO: 0.64 X10(3)/MCL (ref 0.1–1.3)
MONOCYTES NFR BLD AUTO: 6.3 %
MUCOUS THREADS URNS QL MICRO: ABNORMAL /LPF
NEUTROPHILS # BLD AUTO: 6.72 X10(3)/MCL (ref 2.1–9.2)
NEUTROPHILS NFR BLD AUTO: 66.7 %
NITRITE UR QL STRIP: NEGATIVE
NRBC BLD AUTO-RTO: 0 %
OHS QRS DURATION: 72 MS
OHS QTC CALCULATION: 435 MS
OPIATES UR QL SCN: NEGATIVE
PCP UR QL: NEGATIVE
PH UR STRIP: 7 [PH]
PH UR: 7 [PH] (ref 3–11)
PLATELET # BLD AUTO: 352 X10(3)/MCL (ref 130–400)
PMV BLD AUTO: 9 FL (ref 7.4–10.4)
POTASSIUM SERPL-SCNC: 4.2 MMOL/L (ref 3.5–5.1)
PROT SERPL-MCNC: 6.7 GM/DL (ref 6.4–8.3)
PROT UR QL STRIP: ABNORMAL
PROTHROMBIN TIME: 12.3 SECONDS (ref 12.5–14.5)
RBC # BLD AUTO: 5.32 X10(6)/MCL (ref 4.2–5.4)
RBC #/AREA URNS AUTO: ABNORMAL /HPF
SODIUM SERPL-SCNC: 135 MMOL/L (ref 136–145)
SP GR UR STRIP.AUTO: >1.05 (ref 1–1.03)
SPECIFIC GRAVITY, URINE AUTO (.000) (OHS): >1.05 (ref 1–1.03)
SPECIMEN OUTDATE: NORMAL
SQUAMOUS #/AREA URNS LPF: ABNORMAL /HPF
TROPONIN I SERPL-MCNC: <0.01 NG/ML (ref 0–0.04)
UROBILINOGEN UR STRIP-ACNC: NORMAL
WBC # BLD AUTO: 10.08 X10(3)/MCL (ref 4.5–11.5)
WBC #/AREA URNS AUTO: ABNORMAL /HPF

## 2025-01-11 PROCEDURE — 96375 TX/PRO/DX INJ NEW DRUG ADDON: CPT

## 2025-01-11 PROCEDURE — 86850 RBC ANTIBODY SCREEN: CPT | Performed by: EMERGENCY MEDICINE

## 2025-01-11 PROCEDURE — 85610 PROTHROMBIN TIME: CPT | Performed by: EMERGENCY MEDICINE

## 2025-01-11 PROCEDURE — 99285 EMERGENCY DEPT VISIT HI MDM: CPT | Mod: 25

## 2025-01-11 PROCEDURE — 93010 ELECTROCARDIOGRAM REPORT: CPT | Mod: ,,, | Performed by: INTERNAL MEDICINE

## 2025-01-11 PROCEDURE — 25500020 PHARM REV CODE 255: Performed by: EMERGENCY MEDICINE

## 2025-01-11 PROCEDURE — 80053 COMPREHEN METABOLIC PANEL: CPT | Performed by: EMERGENCY MEDICINE

## 2025-01-11 PROCEDURE — 81001 URINALYSIS AUTO W/SCOPE: CPT | Performed by: EMERGENCY MEDICINE

## 2025-01-11 PROCEDURE — 82550 ASSAY OF CK (CPK): CPT | Performed by: EMERGENCY MEDICINE

## 2025-01-11 PROCEDURE — 85730 THROMBOPLASTIN TIME PARTIAL: CPT | Performed by: EMERGENCY MEDICINE

## 2025-01-11 PROCEDURE — 83605 ASSAY OF LACTIC ACID: CPT | Performed by: EMERGENCY MEDICINE

## 2025-01-11 PROCEDURE — 96374 THER/PROPH/DIAG INJ IV PUSH: CPT

## 2025-01-11 PROCEDURE — 84484 ASSAY OF TROPONIN QUANT: CPT | Performed by: EMERGENCY MEDICINE

## 2025-01-11 PROCEDURE — 96376 TX/PRO/DX INJ SAME DRUG ADON: CPT

## 2025-01-11 PROCEDURE — 99284 EMERGENCY DEPT VISIT MOD MDM: CPT | Mod: ,,, | Performed by: SURGERY

## 2025-01-11 PROCEDURE — 82077 ASSAY SPEC XCP UR&BREATH IA: CPT | Performed by: EMERGENCY MEDICINE

## 2025-01-11 PROCEDURE — 93005 ELECTROCARDIOGRAM TRACING: CPT

## 2025-01-11 PROCEDURE — 80307 DRUG TEST PRSMV CHEM ANLYZR: CPT | Performed by: EMERGENCY MEDICINE

## 2025-01-11 PROCEDURE — G0390 TRAUMA RESPONS W/HOSP CRITI: HCPCS

## 2025-01-11 PROCEDURE — 84703 CHORIONIC GONADOTROPIN ASSAY: CPT | Performed by: EMERGENCY MEDICINE

## 2025-01-11 PROCEDURE — 63600175 PHARM REV CODE 636 W HCPCS: Performed by: EMERGENCY MEDICINE

## 2025-01-11 PROCEDURE — 85025 COMPLETE CBC W/AUTO DIFF WBC: CPT | Performed by: EMERGENCY MEDICINE

## 2025-01-11 RX ORDER — ONDANSETRON HYDROCHLORIDE 2 MG/ML
INJECTION, SOLUTION INTRAVENOUS
Status: DISCONTINUED
Start: 2025-01-11 | End: 2025-01-11 | Stop reason: HOSPADM

## 2025-01-11 RX ORDER — FENTANYL CITRATE 50 UG/ML
INJECTION, SOLUTION INTRAMUSCULAR; INTRAVENOUS
Status: DISCONTINUED
Start: 2025-01-11 | End: 2025-01-11 | Stop reason: HOSPADM

## 2025-01-11 RX ORDER — FENTANYL CITRATE 50 UG/ML
50 INJECTION, SOLUTION INTRAMUSCULAR; INTRAVENOUS
Status: COMPLETED | OUTPATIENT
Start: 2025-01-11 | End: 2025-01-11

## 2025-01-11 RX ORDER — FENTANYL CITRATE 50 UG/ML
INJECTION, SOLUTION INTRAMUSCULAR; INTRAVENOUS CODE/TRAUMA/SEDATION MEDICATION
Status: COMPLETED | OUTPATIENT
Start: 2025-01-11 | End: 2025-01-11

## 2025-01-11 RX ORDER — ONDANSETRON HYDROCHLORIDE 2 MG/ML
INJECTION, SOLUTION INTRAVENOUS CODE/TRAUMA/SEDATION MEDICATION
Status: COMPLETED | OUTPATIENT
Start: 2025-01-11 | End: 2025-01-11

## 2025-01-11 RX ORDER — ONDANSETRON HYDROCHLORIDE 2 MG/ML
4 INJECTION, SOLUTION INTRAVENOUS
Status: COMPLETED | OUTPATIENT
Start: 2025-01-11 | End: 2025-01-11

## 2025-01-11 RX ADMIN — IOHEXOL 100 ML: 350 INJECTION, SOLUTION INTRAVENOUS at 01:01

## 2025-01-11 RX ADMIN — ONDANSETRON 4 MG: 2 INJECTION INTRAMUSCULAR; INTRAVENOUS at 12:01

## 2025-01-11 RX ADMIN — ONDANSETRON 4 MG: 2 INJECTION INTRAMUSCULAR; INTRAVENOUS at 01:01

## 2025-01-11 RX ADMIN — FENTANYL CITRATE 50 MCG: 50 INJECTION, SOLUTION INTRAMUSCULAR; INTRAVENOUS at 01:01

## 2025-01-11 RX ADMIN — FENTANYL CITRATE 50 MCG: 50 INJECTION, SOLUTION INTRAMUSCULAR; INTRAVENOUS at 12:01

## 2025-01-11 NOTE — ED NOTES
This RN informed by registration that pt's mother is trying to get pt OPC'd and waiting on call back from ; ER MD aware, does not feel that pt needs to be PEC'd at this time; pt is not gravely disabled, denies SI/HI; pt is ambulatory and within sound capacity, NAD at this time; pt is refusing crutches and splint
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
## 2025-01-11 NOTE — ED NOTES
Notified MD Rosalie of 6/10 pain and requesting meds. Pt is also c/o SOB & CP. Verbal order to do an EKG. See chart for new orders.
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
## 2025-01-11 NOTE — ED NOTES
CT not ready for pt at this time. Was told to wait 5 mins before arrival. Will continue to monitor pt in trauma room. Continuous cardiac and pulse ox monitoring in place.

## 2025-01-11 NOTE — ED NOTES
Pt log rolled while maintaining c-spine immobilization. No step off, deformities, or c-spine tenderness palpated by Dr. Wiggins. No neuro changes.

## 2025-01-11 NOTE — DISCHARGE INSTRUCTIONS
Apply ice to ankle and any other sore areas.  Elevate ankle.  Nonweightbearing especially until the swelling goes down.  Return for any problems or concerns, trouble breathing, weakness, vomiting etc.

## 2025-06-30 ENCOUNTER — OFFICE VISIT (OUTPATIENT)
Dept: URGENT CARE | Facility: CLINIC | Age: 30
End: 2025-06-30
Payer: MEDICAID

## 2025-06-30 VITALS
SYSTOLIC BLOOD PRESSURE: 138 MMHG | HEART RATE: 102 BPM | HEIGHT: 63 IN | DIASTOLIC BLOOD PRESSURE: 87 MMHG | WEIGHT: 212.5 LBS | OXYGEN SATURATION: 98 % | BODY MASS INDEX: 37.65 KG/M2 | TEMPERATURE: 99 F | RESPIRATION RATE: 18 BRPM

## 2025-06-30 DIAGNOSIS — H61.21 IMPACTED CERUMEN OF RIGHT EAR: Primary | ICD-10-CM

## 2025-06-30 DIAGNOSIS — J98.8 WHEEZING-ASSOCIATED RESPIRATORY INFECTION: ICD-10-CM

## 2025-06-30 PROCEDURE — 99204 OFFICE O/P NEW MOD 45 MIN: CPT | Mod: S$GLB,,, | Performed by: NURSE PRACTITIONER

## 2025-06-30 RX ORDER — ALPRAZOLAM 2 MG/1
TABLET ORAL
COMMUNITY
Start: 2025-01-24

## 2025-06-30 RX ORDER — AMOXICILLIN AND CLAVULANATE POTASSIUM 875; 125 MG/1; MG/1
1 TABLET, FILM COATED ORAL EVERY 12 HOURS
Qty: 20 TABLET | Refills: 0 | Status: SHIPPED | OUTPATIENT
Start: 2025-06-30 | End: 2025-07-10

## 2025-06-30 RX ORDER — ALBUTEROL SULFATE 90 UG/1
2 INHALANT RESPIRATORY (INHALATION) EVERY 6 HOURS PRN
Qty: 18 G | Refills: 0 | Status: SHIPPED | OUTPATIENT
Start: 2025-06-30 | End: 2026-06-30

## 2025-06-30 NOTE — PROGRESS NOTES
"Subjective:      Patient ID: Ivana Ibarra is a 29 y.o. female.    Vitals:  height is 5' 3" (1.6 m) and weight is 96.4 kg (212 lb 8.4 oz). Her oral temperature is 98.9 °F (37.2 °C). Her blood pressure is 138/87 and her pulse is 102. Her respiration is 18 and oxygen saturation is 98%.     Chief Complaint: Sinus Problem    Sinus Problem  This is a new problem. Episode onset: 4 days ago. The problem has been gradually worsening since onset. There has been no fever. Her pain is at a severity of 8/10. The pain is moderate. Associated symptoms include congestion, coughing, ear pain (both ears) and headaches. Pertinent negatives include no sinus pressure or sore throat. (Nausea, vomiting, diarrhea) Treatments tried: tylrnol extra strength.       HENT:  Positive for ear pain (both ears) and congestion. Negative for sinus pressure and sore throat.    Respiratory:  Positive for cough.    Neurological:  Positive for headaches.      Objective:     Physical Exam   Constitutional: She is oriented to person, place, and time. normal  HENT:   Ears:   Right Ear: Tympanic membrane normal. impacted cerumen  Nose: Congestion present.   Mouth/Throat: Mucous membranes are moist. Posterior oropharyngeal erythema present.   Eyes: Conjunctivae are normal. Pupils are equal, round, and reactive to light.   Cardiovascular: Normal rate.   Pulmonary/Chest: Effort normal. She has wheezes.   Abdominal: Normal appearance and bowel sounds are normal.   Neurological: no focal deficit. She is alert, oriented to person, place, and time and at baseline.   Skin: Skin is warm. Capillary refill takes less than 2 seconds.   Nursing note and vitals reviewed.      Assessment:     1. Impacted cerumen of right ear    2. Wheezing-associated respiratory infection        Plan:       Impacted cerumen of right ear    Wheezing-associated respiratory infection    Other orders  -     albuterol (PROVENTIL HFA) 90 mcg/actuation inhaler; Inhale 2 puffs into the " lungs every 6 (six) hours as needed for Wheezing. Rescue  Dispense: 18 g; Refill: 0  -     amoxicillin-clavulanate 875-125mg (AUGMENTIN) 875-125 mg per tablet; Take 1 tablet by mouth every 12 (twelve) hours. for 10 days  Dispense: 20 tablet; Refill: 0